# Patient Record
Sex: MALE | Race: WHITE | Employment: FULL TIME | URBAN - METROPOLITAN AREA
[De-identification: names, ages, dates, MRNs, and addresses within clinical notes are randomized per-mention and may not be internally consistent; named-entity substitution may affect disease eponyms.]

---

## 2017-05-23 ENCOUNTER — TELEPHONE (OUTPATIENT)
Dept: INTERNAL MEDICINE CLINIC | Facility: CLINIC | Age: 42
End: 2017-05-23

## 2017-05-23 DIAGNOSIS — I10 ESSENTIAL HYPERTENSION: Primary | ICD-10-CM

## 2017-05-23 RX ORDER — AMLODIPINE BESYLATE 5 MG/1
5 TABLET ORAL
Qty: 30 TABLET | Refills: 0 | Status: SHIPPED | OUTPATIENT
Start: 2017-05-23 | End: 2017-06-08

## 2017-05-23 RX ORDER — AMLODIPINE BESYLATE 5 MG/1
5 TABLET ORAL
Qty: 90 TABLET | Refills: 2 | Status: CANCELLED | OUTPATIENT
Start: 2017-05-23

## 2017-05-31 ENCOUNTER — LAB ENCOUNTER (OUTPATIENT)
Dept: LAB | Age: 42
End: 2017-05-31
Attending: NURSE PRACTITIONER
Payer: COMMERCIAL

## 2017-05-31 DIAGNOSIS — Z00.00 ROUTINE GENERAL MEDICAL EXAMINATION AT A HEALTH CARE FACILITY: Primary | ICD-10-CM

## 2017-05-31 DIAGNOSIS — Z13.29 SCREENING FOR ENDOCRINE, METABOLIC AND IMMUNITY DISORDER: ICD-10-CM

## 2017-05-31 DIAGNOSIS — Z13.228 SCREENING FOR ENDOCRINE, METABOLIC AND IMMUNITY DISORDER: ICD-10-CM

## 2017-05-31 DIAGNOSIS — Z13.0 SCREENING FOR ENDOCRINE, METABOLIC AND IMMUNITY DISORDER: ICD-10-CM

## 2017-05-31 DIAGNOSIS — Z00.00 ROUTINE GENERAL MEDICAL EXAMINATION AT A HEALTH CARE FACILITY: ICD-10-CM

## 2017-05-31 DIAGNOSIS — Z13.0 SCREENING FOR DISORDER OF BLOOD AND BLOOD-FORMING ORGANS: ICD-10-CM

## 2017-05-31 DIAGNOSIS — Z13.220 SCREENING FOR LIPID DISORDERS: ICD-10-CM

## 2017-05-31 DIAGNOSIS — Z13.29 SCREENING FOR THYROID DISORDER: ICD-10-CM

## 2017-05-31 PROCEDURE — 80061 LIPID PANEL: CPT | Performed by: NURSE PRACTITIONER

## 2017-05-31 PROCEDURE — 80050 GENERAL HEALTH PANEL: CPT | Performed by: NURSE PRACTITIONER

## 2017-06-01 NOTE — TELEPHONE ENCOUNTER
Labs were already completed 05/31/17. Results are in and pt has upcoming appt to discuss them  Future Appointments  Date Time Provider Mikala Parker   6/8/2017 8:45 AM NIELS Stafford [facility]     Encounter closing.

## 2017-06-08 ENCOUNTER — OFFICE VISIT (OUTPATIENT)
Dept: INTERNAL MEDICINE CLINIC | Facility: CLINIC | Age: 42
End: 2017-06-08

## 2017-06-08 VITALS
SYSTOLIC BLOOD PRESSURE: 128 MMHG | HEART RATE: 92 BPM | WEIGHT: 173 LBS | HEIGHT: 67 IN | DIASTOLIC BLOOD PRESSURE: 80 MMHG | BODY MASS INDEX: 27.15 KG/M2 | TEMPERATURE: 99 F

## 2017-06-08 DIAGNOSIS — M25.512 ACUTE PAIN OF LEFT SHOULDER: ICD-10-CM

## 2017-06-08 DIAGNOSIS — E78.5 DYSLIPIDEMIA: ICD-10-CM

## 2017-06-08 DIAGNOSIS — I10 ESSENTIAL HYPERTENSION: ICD-10-CM

## 2017-06-08 DIAGNOSIS — J45.20 MILD INTERMITTENT ASTHMA WITHOUT COMPLICATION: ICD-10-CM

## 2017-06-08 DIAGNOSIS — Z00.00 ROUTINE GENERAL MEDICAL EXAMINATION AT A HEALTH CARE FACILITY: Primary | ICD-10-CM

## 2017-06-08 PROCEDURE — 99396 PREV VISIT EST AGE 40-64: CPT | Performed by: NURSE PRACTITIONER

## 2017-06-08 RX ORDER — AMLODIPINE BESYLATE 5 MG/1
5 TABLET ORAL
Qty: 90 TABLET | Refills: 3 | Status: SHIPPED | OUTPATIENT
Start: 2017-06-08 | End: 2018-09-07

## 2017-06-08 NOTE — PROGRESS NOTES
Carlos Palomares is a 35year old male who presents for a complete physical exam.     HPI:   Pt complains of   HTN:   Amlodipine 5mg.   He does not check bp at home   Discussed monitoring at home and he is very interested  Repeat bp 128/80  Discussed goals Disp: 1 Inhaler Rfl: 1      Past Medical History   Diagnosis Date   • Cervicalgia           Past Surgical History    VASECTOMY  2010    OTHER SURGICAL HISTORY  2004    Comment spermatic cord excision of varicocele      Family History   Problem Relation Age tenderness  BREAST: no dominant or suspicious mass  LUNGS: clear to auscultation  CARDIO: RRR without murmur  GI: normal BS, soft, no masses, HSM or tenderness  : two descended testes,no masses,no hernia,no penile lesions  MUSCULOSKELETAL: back is not te

## 2018-02-05 ENCOUNTER — OFFICE VISIT (OUTPATIENT)
Dept: INTERNAL MEDICINE CLINIC | Facility: CLINIC | Age: 43
End: 2018-02-05

## 2018-02-05 VITALS
HEART RATE: 82 BPM | DIASTOLIC BLOOD PRESSURE: 82 MMHG | TEMPERATURE: 98 F | WEIGHT: 169 LBS | HEIGHT: 67.5 IN | RESPIRATION RATE: 16 BRPM | BODY MASS INDEX: 26.22 KG/M2 | SYSTOLIC BLOOD PRESSURE: 136 MMHG

## 2018-02-05 DIAGNOSIS — Z00.00 LABORATORY EXAMINATION ORDERED AS PART OF A COMPLETE PHYSICAL EXAMINATION: ICD-10-CM

## 2018-02-05 DIAGNOSIS — H92.01 RIGHT EAR PAIN: Primary | ICD-10-CM

## 2018-02-05 DIAGNOSIS — J45.20 MILD INTERMITTENT ASTHMA WITHOUT COMPLICATION: ICD-10-CM

## 2018-02-05 DIAGNOSIS — H65.03 BILATERAL ACUTE SEROUS OTITIS MEDIA, RECURRENCE NOT SPECIFIED: ICD-10-CM

## 2018-02-05 PROCEDURE — 99213 OFFICE O/P EST LOW 20 MIN: CPT | Performed by: NURSE PRACTITIONER

## 2018-02-05 RX ORDER — AZITHROMYCIN 250 MG/1
TABLET, FILM COATED ORAL
Qty: 6 TABLET | Refills: 0 | Status: SHIPPED | OUTPATIENT
Start: 2018-02-05 | End: 2019-03-15

## 2018-02-05 RX ORDER — FLUTICASONE PROPIONATE 50 MCG
1 SPRAY, SUSPENSION (ML) NASAL 2 TIMES DAILY
Qty: 1 BOTTLE | Refills: 3 | Status: SHIPPED | OUTPATIENT
Start: 2018-02-05 | End: 2019-03-15

## 2018-02-05 NOTE — PROGRESS NOTES
Faraz Orozco is a 38year old male. Patient presents with:  Sore Throat: pain in the right ear, mild pain in the left since wednesday      HPI:   Here for eval right ear pain and sore throat. Muffled sounds bilaterally. No fever or chills.   Using v constipation  MUSCULOSKELETAL:  No arthralgias or myalgias  NEURO: denies headaches,     EXAM:   /82 (BP Location: Right arm, Patient Position: Sitting, Cuff Size: large)   Pulse 82   Temp 98.1 °F (36.7 °C)   Resp 16   Ht 67.5\"   Wt 169 lb   BMI 26.

## 2018-02-13 ENCOUNTER — HOSPITAL ENCOUNTER (OUTPATIENT)
Age: 43
Discharge: HOME | End: 2018-02-13
Attending: FAMILY MEDICINE
Payer: COMMERCIAL

## 2018-02-13 ENCOUNTER — APPOINTMENT (OUTPATIENT)
Dept: GENERAL RADIOLOGY | Age: 43
End: 2018-02-13
Attending: FAMILY MEDICINE
Payer: COMMERCIAL

## 2018-02-13 VITALS
BODY MASS INDEX: 25.9 KG/M2 | HEART RATE: 70 BPM | DIASTOLIC BLOOD PRESSURE: 92 MMHG | HEIGHT: 67 IN | RESPIRATION RATE: 16 BRPM | OXYGEN SATURATION: 100 % | TEMPERATURE: 97 F | SYSTOLIC BLOOD PRESSURE: 147 MMHG | WEIGHT: 165 LBS

## 2018-02-13 DIAGNOSIS — R42 VERTIGO: Primary | ICD-10-CM

## 2018-02-13 DIAGNOSIS — H65.93 MIDDLE EAR EFFUSION, BILATERAL: ICD-10-CM

## 2018-02-13 DIAGNOSIS — G93.3 POST VIRAL SYNDROME: ICD-10-CM

## 2018-02-13 DIAGNOSIS — J45.909 MILD REACTIVE AIRWAYS DISEASE, UNSPECIFIED WHETHER PERSISTENT: ICD-10-CM

## 2018-02-13 DIAGNOSIS — H83.03 LABYRINTHITIS OF BOTH EARS: ICD-10-CM

## 2018-02-13 LAB
#LYMPHOCYTE IC: 1.8 X10ˆ3/UL (ref 0.9–3.2)
#MXD IC: 0.7 X10ˆ3/UL (ref 0.1–1)
#NEUTROPHIL IC: 6.2 X10ˆ3/UL (ref 1.3–6.7)
CREAT SERPL-MCNC: 1.1 MG/DL (ref 0.7–1.3)
GLUCOSE BLD-MCNC: 99 MG/DL (ref 70–99)
HCT IC: 43.9 % (ref 37–54)
HGB IC: 15 G/DL (ref 13–17)
ISTAT BLOOD GAS TCO2: 29 MMOL/L (ref 22–32)
ISTAT BUN: 24 MG/DL (ref 8–20)
ISTAT CHLORIDE: 102 MMOL/L (ref 101–111)
ISTAT HEMATOCRIT: 43 % (ref 37–53)
ISTAT IONIZED CALCIUM: 1.22 MMOL/L (ref 1.12–1.32)
ISTAT POTASSIUM: 4 MMOL/L (ref 3.6–5.1)
ISTAT SODIUM: 140 MMOL/L (ref 136–144)
ISTAT TROPONIN: <0.1 NG/ML (ref ?–0.1)
LYMPHOCYTES NFR BLD AUTO: 21 %
MCH IC: 29.6 PG (ref 27–33.2)
MCHC IC: 34.2 G/DL (ref 31–37)
MCV IC: 86.8 FL (ref 80–99)
MIXED CELL %: 7.7 %
NEUTROPHILS NFR BLD AUTO: 71.3 %
PLT IC: 254 X10ˆ3/UL (ref 150–450)
POCT RAPID STREP: NEGATIVE
RBC IC: 5.06 X10ˆ6/UL (ref 4.3–5.7)
WBC IC: 8.7 X10ˆ3/UL (ref 4–13)

## 2018-02-13 PROCEDURE — 87081 CULTURE SCREEN ONLY: CPT | Performed by: FAMILY MEDICINE

## 2018-02-13 PROCEDURE — 71046 X-RAY EXAM CHEST 2 VIEWS: CPT | Performed by: FAMILY MEDICINE

## 2018-02-13 PROCEDURE — 93005 ELECTROCARDIOGRAM TRACING: CPT

## 2018-02-13 PROCEDURE — 80047 BASIC METABLC PNL IONIZED CA: CPT

## 2018-02-13 PROCEDURE — 96360 HYDRATION IV INFUSION INIT: CPT

## 2018-02-13 PROCEDURE — 87430 STREP A AG IA: CPT | Performed by: FAMILY MEDICINE

## 2018-02-13 PROCEDURE — 84484 ASSAY OF TROPONIN QUANT: CPT

## 2018-02-13 PROCEDURE — 99215 OFFICE O/P EST HI 40 MIN: CPT

## 2018-02-13 PROCEDURE — 85025 COMPLETE CBC W/AUTO DIFF WBC: CPT | Performed by: FAMILY MEDICINE

## 2018-02-13 PROCEDURE — 94640 AIRWAY INHALATION TREATMENT: CPT

## 2018-02-13 PROCEDURE — 99205 OFFICE O/P NEW HI 60 MIN: CPT

## 2018-02-13 PROCEDURE — 93010 ELECTROCARDIOGRAM REPORT: CPT

## 2018-02-13 RX ORDER — SODIUM CHLORIDE 9 MG/ML
1000 INJECTION, SOLUTION INTRAVENOUS ONCE
Status: COMPLETED | OUTPATIENT
Start: 2018-02-13 | End: 2018-02-13

## 2018-02-13 RX ORDER — MECLIZINE HYDROCHLORIDE 25 MG/1
25 TABLET ORAL 3 TIMES DAILY PRN
Qty: 15 TABLET | Refills: 0 | Status: SHIPPED | OUTPATIENT
Start: 2018-02-13 | End: 2018-02-18

## 2018-02-13 RX ORDER — PREDNISONE 10 MG/1
TABLET ORAL
Qty: 30 TABLET | Refills: 0 | Status: SHIPPED | OUTPATIENT
Start: 2018-02-13 | End: 2018-02-22

## 2018-02-13 RX ORDER — IPRATROPIUM BROMIDE AND ALBUTEROL SULFATE 2.5; .5 MG/3ML; MG/3ML
3 SOLUTION RESPIRATORY (INHALATION) ONCE
Status: COMPLETED | OUTPATIENT
Start: 2018-02-13 | End: 2018-02-13

## 2018-02-13 NOTE — ED PROVIDER NOTES
Patient Seen in: [id number] [facility]    History   Patient presents with:  Headache (neurologic)  Dizziness (neurologic)    Stated Complaint: DIZZINESS/HEAD ACHE/LEGS NUMBNESS    HPI  39 yo M here with complaints of dizziness (feeling like he 16   Ht 170.2 cm (5' 7\")   Wt 74.8 kg   SpO2 100%   BMI 25.84 kg/m²         Physical Exam  GEN: Not in any acute distress, making good conversation, answering appropriately   SKIN: No pallor, no erythema, no cyanosis, warm and dry  Eyes: wnl, normal conju cartridge Once      ISTAT Troponin Once      Grp A Strep Cult, Throat Once      Orthostatic VS          Order Comments:              Orthostatic VS      iStat (Chem 8)      Place PIV Once          Order Comments:              Saline lock      Nursing commu 2123  ------------------------------------------------------------       [facility]       Labs are all normal except for mild dehydration noted   EKG is normal and normal cardiac lab test   Avoid exercise or any strenuous activity for the next 1-2 weeks   Rx Carlosl

## 2018-02-13 NOTE — ED INITIAL ASSESSMENT (HPI)
Pt sts 2 weeks ago began with joan ears feeling plugged, and like he was \"coming down with something\". Has been flying frequently recently. Saw PMD 10 days ago- prescribed z juliana. . Ear issue resolved by Friday. On Sunday began feeling dizzy.  HA began tod

## 2018-02-15 LAB
ATRIAL RATE: 68 BPM
P AXIS: -2 DEGREES
P-R INTERVAL: 156 MS
Q-T INTERVAL: 394 MS
QRS DURATION: 80 MS
QTC CALCULATION (BEZET): 418 MS
R AXIS: 13 DEGREES
T AXIS: 9 DEGREES
VENTRICULAR RATE: 68 BPM

## 2018-09-07 DIAGNOSIS — I10 ESSENTIAL HYPERTENSION: ICD-10-CM

## 2018-09-07 RX ORDER — AMLODIPINE BESYLATE 5 MG/1
TABLET ORAL
Qty: 90 TABLET | Refills: 0 | Status: SHIPPED | OUTPATIENT
Start: 2018-09-07 | End: 2019-03-15

## 2018-09-07 NOTE — TELEPHONE ENCOUNTER
Last Office Visit: 2-5-18 with SD for sore throat  Last Rx Filled: 6-8-17 90 tabs with 3 refills  Last Labs: 2-13-18 troponin/cmp/cbc/rapid strep/strep culture  Future Appointment: none    Per protocol to provider

## 2019-03-11 ENCOUNTER — LAB ENCOUNTER (OUTPATIENT)
Dept: LAB | Facility: HOSPITAL | Age: 44
End: 2019-03-11
Attending: INTERNAL MEDICINE
Payer: COMMERCIAL

## 2019-03-11 ENCOUNTER — TELEPHONE (OUTPATIENT)
Dept: INTERNAL MEDICINE CLINIC | Facility: CLINIC | Age: 44
End: 2019-03-11

## 2019-03-11 DIAGNOSIS — Z13.228 SCREENING FOR METABOLIC DISORDER: ICD-10-CM

## 2019-03-11 DIAGNOSIS — Z00.00 ROUTINE GENERAL MEDICAL EXAMINATION AT A HEALTH CARE FACILITY: ICD-10-CM

## 2019-03-11 DIAGNOSIS — Z13.29 SCREENING FOR THYROID DISORDER: ICD-10-CM

## 2019-03-11 DIAGNOSIS — Z13.220 SCREENING FOR LIPID DISORDERS: ICD-10-CM

## 2019-03-11 DIAGNOSIS — Z13.0 SCREENING FOR DISORDER OF BLOOD AND BLOOD-FORMING ORGANS: ICD-10-CM

## 2019-03-11 DIAGNOSIS — Z00.00 ROUTINE GENERAL MEDICAL EXAMINATION AT A HEALTH CARE FACILITY: Primary | ICD-10-CM

## 2019-03-11 LAB
ALBUMIN SERPL-MCNC: 3.9 G/DL (ref 3.4–5)
ALBUMIN/GLOB SERPL: 1.1 {RATIO} (ref 1–2)
ALP LIVER SERPL-CCNC: 102 U/L (ref 45–117)
ALT SERPL-CCNC: 34 U/L (ref 16–61)
ANION GAP SERPL CALC-SCNC: 6 MMOL/L (ref 0–18)
AST SERPL-CCNC: 31 U/L (ref 15–37)
BASOPHILS # BLD AUTO: 0.02 X10(3) UL (ref 0–0.2)
BASOPHILS NFR BLD AUTO: 0.3 %
BILIRUB SERPL-MCNC: 0.7 MG/DL (ref 0.1–2)
BUN BLD-MCNC: 18 MG/DL (ref 7–18)
BUN/CREAT SERPL: 15.1 (ref 10–20)
CALCIUM BLD-MCNC: 9 MG/DL (ref 8.5–10.1)
CHLORIDE SERPL-SCNC: 108 MMOL/L (ref 98–107)
CHOLEST SMN-MCNC: 195 MG/DL (ref ?–200)
CO2 SERPL-SCNC: 27 MMOL/L (ref 21–32)
CREAT BLD-MCNC: 1.19 MG/DL (ref 0.7–1.3)
DEPRECATED RDW RBC AUTO: 40 FL (ref 35.1–46.3)
EOSINOPHIL # BLD AUTO: 0.08 X10(3) UL (ref 0–0.7)
EOSINOPHIL NFR BLD AUTO: 1.2 %
ERYTHROCYTE [DISTWIDTH] IN BLOOD BY AUTOMATED COUNT: 12.6 % (ref 11–15)
GLOBULIN PLAS-MCNC: 3.7 G/DL (ref 2.8–4.4)
GLUCOSE BLD-MCNC: 87 MG/DL (ref 70–99)
HCT VFR BLD AUTO: 44 % (ref 39–53)
HDLC SERPL-MCNC: 48 MG/DL (ref 40–59)
HGB BLD-MCNC: 14.7 G/DL (ref 13–17.5)
IMM GRANULOCYTES # BLD AUTO: 0.03 X10(3) UL (ref 0–1)
IMM GRANULOCYTES NFR BLD: 0.5 %
LDLC SERPL CALC-MCNC: 121 MG/DL (ref ?–100)
LYMPHOCYTES # BLD AUTO: 1.76 X10(3) UL (ref 1–4)
LYMPHOCYTES NFR BLD AUTO: 26.8 %
M PROTEIN MFR SERPL ELPH: 7.6 G/DL (ref 6.4–8.2)
MCH RBC QN AUTO: 29.1 PG (ref 26–34)
MCHC RBC AUTO-ENTMCNC: 33.4 G/DL (ref 31–37)
MCV RBC AUTO: 87.1 FL (ref 80–100)
MONOCYTES # BLD AUTO: 0.58 X10(3) UL (ref 0.1–1)
MONOCYTES NFR BLD AUTO: 8.8 %
NEUTROPHILS # BLD AUTO: 4.1 X10 (3) UL (ref 1.5–7.7)
NEUTROPHILS # BLD AUTO: 4.1 X10(3) UL (ref 1.5–7.7)
NEUTROPHILS NFR BLD AUTO: 62.4 %
NONHDLC SERPL-MCNC: 147 MG/DL (ref ?–130)
OSMOLALITY SERPL CALC.SUM OF ELEC: 293 MOSM/KG (ref 275–295)
PLATELET # BLD AUTO: 220 10(3)UL (ref 150–450)
POTASSIUM SERPL-SCNC: 3.8 MMOL/L (ref 3.5–5.1)
RBC # BLD AUTO: 5.05 X10(6)UL (ref 4.3–5.7)
SODIUM SERPL-SCNC: 141 MMOL/L (ref 136–145)
TRIGL SERPL-MCNC: 129 MG/DL (ref 30–149)
TSI SER-ACNC: 0.47 MIU/ML (ref 0.36–3.74)
VLDLC SERPL CALC-MCNC: 26 MG/DL (ref 0–30)
WBC # BLD AUTO: 6.6 X10(3) UL (ref 4–11)

## 2019-03-11 PROCEDURE — 36415 COLL VENOUS BLD VENIPUNCTURE: CPT

## 2019-03-11 PROCEDURE — 84443 ASSAY THYROID STIM HORMONE: CPT

## 2019-03-11 PROCEDURE — 80061 LIPID PANEL: CPT

## 2019-03-11 PROCEDURE — 85025 COMPLETE CBC W/AUTO DIFF WBC: CPT

## 2019-03-11 PROCEDURE — 80053 COMPREHEN METABOLIC PANEL: CPT

## 2019-03-11 NOTE — TELEPHONE ENCOUNTER
pt jeffrey fritsch [birth year] is sched for cpe this friday and no orders put in yet-he was to see SD but she is not here so I moved him to AD-please add cpe labs as he wants to come here this AM since he is fasting-please let me know when done so i can call

## 2019-03-15 ENCOUNTER — OFFICE VISIT (OUTPATIENT)
Dept: INTERNAL MEDICINE CLINIC | Facility: CLINIC | Age: 44
End: 2019-03-15
Payer: COMMERCIAL

## 2019-03-15 VITALS
RESPIRATION RATE: 16 BRPM | BODY MASS INDEX: 26.49 KG/M2 | WEIGHT: 168.81 LBS | SYSTOLIC BLOOD PRESSURE: 132 MMHG | HEIGHT: 67 IN | HEART RATE: 80 BPM | OXYGEN SATURATION: 98 % | DIASTOLIC BLOOD PRESSURE: 80 MMHG

## 2019-03-15 DIAGNOSIS — E78.5 DYSLIPIDEMIA: ICD-10-CM

## 2019-03-15 DIAGNOSIS — I10 ESSENTIAL HYPERTENSION: ICD-10-CM

## 2019-03-15 DIAGNOSIS — N52.1 ERECTILE DYSFUNCTION DUE TO DISEASES CLASSIFIED ELSEWHERE: ICD-10-CM

## 2019-03-15 DIAGNOSIS — Z00.00 ANNUAL PHYSICAL EXAM: Primary | ICD-10-CM

## 2019-03-15 DIAGNOSIS — J45.20 MILD INTERMITTENT ASTHMA WITHOUT COMPLICATION: ICD-10-CM

## 2019-03-15 DIAGNOSIS — L98.9 FACIAL SKIN LESION: ICD-10-CM

## 2019-03-15 PROCEDURE — 99396 PREV VISIT EST AGE 40-64: CPT | Performed by: INTERNAL MEDICINE

## 2019-03-15 RX ORDER — SILDENAFIL 50 MG/1
50 TABLET, FILM COATED ORAL
Qty: 7 TABLET | Refills: 1 | Status: SHIPPED | OUTPATIENT
Start: 2019-03-15 | End: 2020-06-04

## 2019-03-15 NOTE — PROGRESS NOTES
Madan Urrutia  10/12/1975    Patient presents with:  Physical: cn room 6: patient is here for a physical       HPI:   Madan Urrutia is a 37year old male who presents for an annual physical examination.     The patient has been in his usual state of Hypertension Mother       Social History    Tobacco Use      Smoking status: Never Smoker      Smokeless tobacco: Never Used    Alcohol use:  Yes      Alcohol/week: 0.6 oz      Types: 1 Standard drinks or equivalent per week      Frequency: 2-4 times a ayaka management    Psychogenic erectile dysfunction:  I have offered the patient a short course of medical therapy    Benign essential hypertension:  Resolved with lifestyle management only  I have discontinued Norvasc from the patient's medical regimen at this

## 2019-05-16 ENCOUNTER — OFFICE VISIT (OUTPATIENT)
Dept: INTERNAL MEDICINE CLINIC | Facility: CLINIC | Age: 44
End: 2019-05-16

## 2019-05-16 VITALS
HEART RATE: 80 BPM | WEIGHT: 179 LBS | HEIGHT: 67 IN | BODY MASS INDEX: 28.09 KG/M2 | SYSTOLIC BLOOD PRESSURE: 154 MMHG | RESPIRATION RATE: 16 BRPM | DIASTOLIC BLOOD PRESSURE: 108 MMHG

## 2019-05-16 DIAGNOSIS — I10 ESSENTIAL HYPERTENSION: Primary | ICD-10-CM

## 2019-05-16 DIAGNOSIS — E78.5 DYSLIPIDEMIA: ICD-10-CM

## 2019-05-16 DIAGNOSIS — L98.9 FACIAL SKIN LESION: ICD-10-CM

## 2019-05-16 PROCEDURE — 99214 OFFICE O/P EST MOD 30 MIN: CPT | Performed by: INTERNAL MEDICINE

## 2019-05-16 RX ORDER — AMLODIPINE BESYLATE 5 MG/1
5 TABLET ORAL DAILY
Qty: 90 TABLET | Refills: 3 | Status: SHIPPED | OUTPATIENT
Start: 2019-05-16 | End: 2020-03-20

## 2019-05-16 NOTE — PROGRESS NOTES
Katherine Morales  10/12/1975    Patient presents with: Follow - Up: on blood pressure medication. Pt states he is having headaches lately and he went to optometrist office BP was elevated.       Imanjosie Wendy is a 37year old male who pre for Wheezing.  Disp: 1 Inhaler Rfl: 1      No Known Allergies   Past Medical History:   Diagnosis Date   • Cervicalgia    • Essential hypertension       Patient Active Problem List:     Asthma     Unspecified essential hypertension     Dyslipidemia     Past office. Skin lesion on the right face:  Stable  Re-referred to dermatology service    Dyslipidemia:  Lifestyle management advised    The patient indicates understanding of these issues and agrees to the plan.     TODAY'S ORDERS     No orders of the defin

## 2020-03-20 RX ORDER — AMLODIPINE BESYLATE 5 MG/1
TABLET ORAL
Qty: 30 TABLET | Refills: 0 | Status: SHIPPED | OUTPATIENT
Start: 2020-03-20 | End: 2020-05-20

## 2020-03-20 NOTE — TELEPHONE ENCOUNTER
Last VISIT 05/16/19    Last REFILL 05/16/19 qty 90 w/3 refills    Last LABS 03/11/19 CBC, CMP, Lipid, TSH    No future appointments. Per PROTOCOL? Failed    Please Approve or Deny.

## 2020-03-20 NOTE — TELEPHONE ENCOUNTER
Overdue for office visit. Needs laboratory and physical evaluation. 30-day supply given. Please schedule for 30 days from now at this time.

## 2020-05-20 RX ORDER — AMLODIPINE BESYLATE 5 MG/1
TABLET ORAL
Qty: 30 TABLET | Refills: 0 | Status: SHIPPED | OUTPATIENT
Start: 2020-05-20 | End: 2020-06-04

## 2020-05-20 NOTE — TELEPHONE ENCOUNTER
Last Ov:5/16/19  Upcoming appt:none  Last labs:3/11/19 cbc, cmp, lipid, TSH  Last Rx: 3/20/20 Amlodipine Besylate 5mg    Per Protocol sent for review    Due for physical

## 2020-05-28 ENCOUNTER — TELEPHONE (OUTPATIENT)
Dept: INTERNAL MEDICINE CLINIC | Facility: CLINIC | Age: 45
End: 2020-05-28

## 2020-05-28 DIAGNOSIS — Z13.0 SCREENING FOR DISORDER OF BLOOD AND BLOOD-FORMING ORGANS: ICD-10-CM

## 2020-05-28 DIAGNOSIS — Z13.220 SCREENING FOR LIPID DISORDERS: ICD-10-CM

## 2020-05-28 DIAGNOSIS — I10 ESSENTIAL HYPERTENSION: ICD-10-CM

## 2020-05-28 DIAGNOSIS — Z13.228 SCREENING FOR METABOLIC DISORDER: ICD-10-CM

## 2020-05-28 DIAGNOSIS — Z13.29 SCREENING FOR THYROID DISORDER: ICD-10-CM

## 2020-05-28 DIAGNOSIS — Z00.00 ROUTINE GENERAL MEDICAL EXAMINATION AT A HEALTH CARE FACILITY: ICD-10-CM

## 2020-05-28 DIAGNOSIS — E78.5 DYSLIPIDEMIA: ICD-10-CM

## 2020-05-28 DIAGNOSIS — J45.20 MILD INTERMITTENT ASTHMA WITHOUT COMPLICATION: ICD-10-CM

## 2020-05-28 DIAGNOSIS — Z00.00 ANNUAL PHYSICAL EXAM: Primary | ICD-10-CM

## 2020-05-28 DIAGNOSIS — Z12.5 ENCOUNTER FOR SCREENING FOR MALIGNANT NEOPLASM OF PROSTATE: ICD-10-CM

## 2020-05-28 NOTE — TELEPHONE ENCOUNTER
Future Appointments   Date Time Provider Mikala Parker   6/4/2020  3:00 PM Tim Paul MD [facility]     For CPE,  APPT IS NEXT WEEK       [facility] Pt aware to complete 5-7 days ahead.

## 2020-05-28 NOTE — TELEPHONE ENCOUNTER
Future Appointments   Date Time Provider Mikala Parker   6/4/2020  3:00 PM Mitra Vela MD [facility]     For CPE

## 2020-06-02 ENCOUNTER — LAB ENCOUNTER (OUTPATIENT)
Dept: LAB | Facility: HOSPITAL | Age: 45
End: 2020-06-02
Attending: INTERNAL MEDICINE
Payer: COMMERCIAL

## 2020-06-02 DIAGNOSIS — J45.20 MILD INTERMITTENT ASTHMA WITHOUT COMPLICATION: ICD-10-CM

## 2020-06-02 DIAGNOSIS — Z13.29 SCREENING FOR THYROID DISORDER: ICD-10-CM

## 2020-06-02 DIAGNOSIS — Z13.220 SCREENING FOR LIPID DISORDERS: ICD-10-CM

## 2020-06-02 DIAGNOSIS — Z12.5 ENCOUNTER FOR SCREENING FOR MALIGNANT NEOPLASM OF PROSTATE: ICD-10-CM

## 2020-06-02 DIAGNOSIS — E78.5 DYSLIPIDEMIA: ICD-10-CM

## 2020-06-02 DIAGNOSIS — Z00.00 ROUTINE GENERAL MEDICAL EXAMINATION AT A HEALTH CARE FACILITY: ICD-10-CM

## 2020-06-02 DIAGNOSIS — I10 ESSENTIAL HYPERTENSION: ICD-10-CM

## 2020-06-02 DIAGNOSIS — Z13.0 SCREENING FOR DISORDER OF BLOOD AND BLOOD-FORMING ORGANS: ICD-10-CM

## 2020-06-02 DIAGNOSIS — Z13.228 SCREENING FOR METABOLIC DISORDER: ICD-10-CM

## 2020-06-02 PROCEDURE — 85025 COMPLETE CBC W/AUTO DIFF WBC: CPT

## 2020-06-02 PROCEDURE — 80053 COMPREHEN METABOLIC PANEL: CPT

## 2020-06-02 PROCEDURE — 84443 ASSAY THYROID STIM HORMONE: CPT

## 2020-06-02 PROCEDURE — 80061 LIPID PANEL: CPT

## 2020-06-02 PROCEDURE — 36415 COLL VENOUS BLD VENIPUNCTURE: CPT

## 2020-06-04 ENCOUNTER — OFFICE VISIT (OUTPATIENT)
Dept: INTERNAL MEDICINE CLINIC | Facility: CLINIC | Age: 45
End: 2020-06-04
Payer: COMMERCIAL

## 2020-06-04 VITALS
RESPIRATION RATE: 16 BRPM | WEIGHT: 169 LBS | SYSTOLIC BLOOD PRESSURE: 128 MMHG | TEMPERATURE: 99 F | HEART RATE: 88 BPM | BODY MASS INDEX: 26.84 KG/M2 | DIASTOLIC BLOOD PRESSURE: 86 MMHG | HEIGHT: 66.73 IN

## 2020-06-04 DIAGNOSIS — N52.1 ERECTILE DYSFUNCTION DUE TO DISEASES CLASSIFIED ELSEWHERE: ICD-10-CM

## 2020-06-04 DIAGNOSIS — J45.20 MILD INTERMITTENT ASTHMA WITHOUT COMPLICATION: ICD-10-CM

## 2020-06-04 DIAGNOSIS — I10 ESSENTIAL HYPERTENSION: ICD-10-CM

## 2020-06-04 DIAGNOSIS — Z00.00 ANNUAL PHYSICAL EXAM: Primary | ICD-10-CM

## 2020-06-04 PROCEDURE — 99396 PREV VISIT EST AGE 40-64: CPT | Performed by: INTERNAL MEDICINE

## 2020-06-04 RX ORDER — AMLODIPINE BESYLATE 5 MG/1
5 TABLET ORAL DAILY
Qty: 90 TABLET | Refills: 3 | Status: SHIPPED | OUTPATIENT
Start: 2020-06-04

## 2020-06-04 RX ORDER — AMLODIPINE BESYLATE 5 MG/1
5 TABLET ORAL DAILY
Qty: 90 TABLET | Refills: 3 | Status: SHIPPED | OUTPATIENT
Start: 2020-06-04 | End: 2020-06-04

## 2020-06-04 RX ORDER — SILDENAFIL 100 MG/1
100 TABLET, FILM COATED ORAL
Qty: 10 TABLET | Refills: 1 | Status: SHIPPED | OUTPATIENT
Start: 2020-06-04

## 2020-06-04 RX ORDER — SILDENAFIL 100 MG/1
100 TABLET, FILM COATED ORAL
Qty: 10 TABLET | Refills: 1 | Status: SHIPPED | OUTPATIENT
Start: 2020-06-04 | End: 2020-06-04

## 2020-06-04 NOTE — PROGRESS NOTES
Tiny Morillo  10/12/1975    Patient presents with:  Wellness Visit      HPI:   Tiny Morillo is a 53year old male who presents for an annual physical examination.     The patient has been in his usual state of health and denies any acute complaint drinks      Types: 1 Standard drinks or equivalent per week      Frequency: 2-4 times a month      Drinks per session: 3 or 4      Binge frequency: Less than monthly      Comment: CAGe 6/4/20    Drug use: No        REVIEW OF SYSTEMS:   GENERAL: feels well placed in this encounter. Meds & Refills:  Requested Prescriptions     Signed Prescriptions Disp Refills   • amLODIPine Besylate 5 MG Oral Tab 90 tablet 3     Sig: Take 1 tablet (5 mg total) by mouth daily.    • Sildenafil Citrate (VIAGRA) 100 MG Oral

## 2021-04-08 ENCOUNTER — TELEPHONE (OUTPATIENT)
Dept: INTERNAL MEDICINE CLINIC | Facility: CLINIC | Age: 46
End: 2021-04-08

## 2021-04-08 NOTE — TELEPHONE ENCOUNTER
Patient states one month ago a blood vessel broke in his right eye, saw his EYE MD who reported no damage to his eye but talked to him about elevated BP, lifting weights.   Pt started taking BP again after that at home and noticed the last 2-3 days his BP h

## 2021-04-08 NOTE — TELEPHONE ENCOUNTER
Okay to increase to amlodipine 5 mg 2 tabs once daily (10 mg every morning). Will see patient and manually evaluate BP on 4/13 during scheduled ov.

## 2021-04-08 NOTE — TELEPHONE ENCOUNTER
Future Appointments   Date Time Provider Mikala Parker   4/13/2021  1:20 PM Kelly Cornejo MD [facility]     Patient has an appointment on Tuesday and is on Amlodipine 5 mg for BP. Patient noticed the last 2 days that his BP is high.   Patient

## 2021-04-08 NOTE — TELEPHONE ENCOUNTER
Patient notified ok to increase Amlodipine 5mg daily to 10mg daily- take 2- 5mg tablets in the am, monitor and log BP and bring in log and bp cuff with him to appt scheduled for 4/13/21. Pt verbalizes understanding.

## 2021-04-13 ENCOUNTER — OFFICE VISIT (OUTPATIENT)
Dept: INTERNAL MEDICINE CLINIC | Facility: CLINIC | Age: 46
End: 2021-04-13
Payer: COMMERCIAL

## 2021-04-13 VITALS
SYSTOLIC BLOOD PRESSURE: 134 MMHG | BODY MASS INDEX: 27 KG/M2 | WEIGHT: 170 LBS | TEMPERATURE: 98 F | HEART RATE: 83 BPM | HEIGHT: 66.73 IN | DIASTOLIC BLOOD PRESSURE: 74 MMHG

## 2021-04-13 DIAGNOSIS — I10 ESSENTIAL HYPERTENSION: ICD-10-CM

## 2021-04-13 DIAGNOSIS — G47.33 MODERATE OBSTRUCTIVE SLEEP APNEA: Primary | ICD-10-CM

## 2021-04-13 DIAGNOSIS — N52.9 ERECTILE DYSFUNCTION, UNSPECIFIED ERECTILE DYSFUNCTION TYPE: ICD-10-CM

## 2021-04-13 PROCEDURE — 99214 OFFICE O/P EST MOD 30 MIN: CPT | Performed by: INTERNAL MEDICINE

## 2021-04-13 PROCEDURE — 3008F BODY MASS INDEX DOCD: CPT | Performed by: INTERNAL MEDICINE

## 2021-04-13 PROCEDURE — 3075F SYST BP GE 130 - 139MM HG: CPT | Performed by: INTERNAL MEDICINE

## 2021-04-13 PROCEDURE — 3078F DIAST BP <80 MM HG: CPT | Performed by: INTERNAL MEDICINE

## 2021-04-13 RX ORDER — AMLODIPINE BESYLATE 10 MG/1
10 TABLET ORAL DAILY
Qty: 90 TABLET | Refills: 0 | Status: SHIPPED | OUTPATIENT
Start: 2021-04-13 | End: 2021-07-22

## 2021-04-13 NOTE — PROGRESS NOTES
Josue Kline  10/12/1975    No chief complaint on file. SUBJECTIVE   Josue Kline is a 50year old male who presents as a follow-up.     The patient has a history of moderate obstructive sleep apnea, as was diagnosed approximately 10 months a No Known Allergies   Past Medical History:   Diagnosis Date   • Cervicalgia    • Essential hypertension       Patient Active Problem List:     Asthma     Essential hypertension     Dyslipidemia     Erectile dysfunction due to diseases classified elsewher ordered    Hypertension:  Controlled while in office, however per home blood pressure evaluation there are several episodes of elevated/uncontrolled blood pressure. Increase amlodipine from 5 mg to 10 mg daily.   DASH lifestyle discussed  Continue current

## 2021-04-14 ENCOUNTER — PATIENT MESSAGE (OUTPATIENT)
Dept: INTERNAL MEDICINE CLINIC | Facility: CLINIC | Age: 46
End: 2021-04-14

## 2021-04-14 NOTE — TELEPHONE ENCOUNTER
From: Deya Winters  To: Tierney Bishop MD  Sent: 4/14/2021 1:59 PM CDT  Subject: Visit Jesica Loera,    I received a phone call regarding the scheduling of the sleep study. Thank you.     Concerning the testosterone level screeni

## 2021-04-20 ENCOUNTER — LAB ENCOUNTER (OUTPATIENT)
Dept: LAB | Age: 46
End: 2021-04-20
Attending: INTERNAL MEDICINE
Payer: COMMERCIAL

## 2021-04-20 DIAGNOSIS — N52.9 ERECTILE DYSFUNCTION, UNSPECIFIED ERECTILE DYSFUNCTION TYPE: ICD-10-CM

## 2021-04-20 PROCEDURE — 36415 COLL VENOUS BLD VENIPUNCTURE: CPT | Performed by: INTERNAL MEDICINE

## 2021-04-20 PROCEDURE — 84403 ASSAY OF TOTAL TESTOSTERONE: CPT | Performed by: INTERNAL MEDICINE

## 2021-05-10 ENCOUNTER — OFFICE VISIT (OUTPATIENT)
Dept: SLEEP CENTER | Age: 46
End: 2021-05-10
Attending: INTERNAL MEDICINE
Payer: COMMERCIAL

## 2021-05-10 DIAGNOSIS — G47.33 MODERATE OBSTRUCTIVE SLEEP APNEA: ICD-10-CM

## 2021-05-10 PROCEDURE — 95810 POLYSOM 6/> YRS 4/> PARAM: CPT

## 2021-07-22 RX ORDER — AMLODIPINE BESYLATE 10 MG/1
10 TABLET ORAL DAILY
Qty: 90 TABLET | Refills: 1 | Status: SHIPPED | OUTPATIENT
Start: 2021-07-22 | End: 2022-10-07

## 2021-07-22 NOTE — TELEPHONE ENCOUNTER
Last VISIT 04/13/21    Last REFILL 04/13/21 qty 90 w/0 refills    Last LABS 04/20/21 Testosterone done    No future appointments. Per PROTOCOL? Failed    Please Approve or Deny.

## 2022-02-17 ENCOUNTER — OFFICE VISIT (OUTPATIENT)
Dept: INTERNAL MEDICINE CLINIC | Facility: CLINIC | Age: 47
End: 2022-02-17
Payer: COMMERCIAL

## 2022-02-17 ENCOUNTER — TELEPHONE (OUTPATIENT)
Dept: INTERNAL MEDICINE CLINIC | Facility: CLINIC | Age: 47
End: 2022-02-17

## 2022-02-17 VITALS
OXYGEN SATURATION: 99 % | SYSTOLIC BLOOD PRESSURE: 154 MMHG | DIASTOLIC BLOOD PRESSURE: 84 MMHG | WEIGHT: 180 LBS | HEIGHT: 66.73 IN | HEART RATE: 76 BPM | RESPIRATION RATE: 16 BRPM | TEMPERATURE: 98 F | BODY MASS INDEX: 28.59 KG/M2

## 2022-02-17 DIAGNOSIS — I10 ESSENTIAL HYPERTENSION: Primary | ICD-10-CM

## 2022-02-17 DIAGNOSIS — F41.9 ANXIETY: ICD-10-CM

## 2022-02-17 DIAGNOSIS — G47.33 MODERATE OBSTRUCTIVE SLEEP APNEA: ICD-10-CM

## 2022-02-17 PROCEDURE — 3008F BODY MASS INDEX DOCD: CPT | Performed by: FAMILY MEDICINE

## 2022-02-17 PROCEDURE — 99214 OFFICE O/P EST MOD 30 MIN: CPT | Performed by: FAMILY MEDICINE

## 2022-02-17 PROCEDURE — 3079F DIAST BP 80-89 MM HG: CPT | Performed by: FAMILY MEDICINE

## 2022-02-17 PROCEDURE — 3077F SYST BP >= 140 MM HG: CPT | Performed by: FAMILY MEDICINE

## 2022-02-17 RX ORDER — HYDROCHLOROTHIAZIDE 12.5 MG/1
12.5 CAPSULE, GELATIN COATED ORAL EVERY MORNING
Qty: 90 CAPSULE | Refills: 0 | Status: SHIPPED | OUTPATIENT
Start: 2022-02-17

## 2022-02-17 RX ORDER — BUPROPION HYDROCHLORIDE 150 MG/1
150 TABLET ORAL DAILY
Qty: 30 TABLET | Refills: 0 | Status: SHIPPED | OUTPATIENT
Start: 2022-02-17

## 2022-02-17 NOTE — TELEPHONE ENCOUNTER
Future Appointments   Date Time Provider Mikala Parker   2/17/2022 10:40 AM Hong Longo MD [facility]     Patient has a significant headache this morning and his BP was 158/96. Patient states his BP's seem to be all over the place. On 2/15 it was 146/87. Patient states he was not taking his BP's for a while but feels this has been going on since November. His concern; is that his BP medication was changed from 5mg to 10mg.

## 2022-02-17 NOTE — TELEPHONE ENCOUNTER
Pt is having CPE on 3/17/22. He will need an [facility] lab order. Last CPE was 6/4/20.

## 2022-02-17 NOTE — TELEPHONE ENCOUNTER
LOV with AD 4/13/2021. Spoke to [facility]. [facility] ok with appt. Appt switched from 20 min to 40 min per [facility]. Routing to AD for fyi.

## 2022-05-26 ENCOUNTER — OFFICE VISIT (OUTPATIENT)
Dept: SLEEP CENTER | Age: 47
End: 2022-05-26
Attending: INTERNAL MEDICINE
Payer: COMMERCIAL

## 2022-05-26 DIAGNOSIS — G47.10 HYPERSOMNIA: ICD-10-CM

## 2022-05-26 DIAGNOSIS — R06.83 SNORING: ICD-10-CM

## 2022-05-26 DIAGNOSIS — G47.33 OSA (OBSTRUCTIVE SLEEP APNEA): ICD-10-CM

## 2022-05-26 DIAGNOSIS — I10 HTN (HYPERTENSION): ICD-10-CM

## 2022-05-26 PROCEDURE — 95811 POLYSOM 6/>YRS CPAP 4/> PARM: CPT

## 2022-10-07 RX ORDER — AMLODIPINE BESYLATE 10 MG/1
10 TABLET ORAL DAILY
Qty: 30 TABLET | Refills: 0 | Status: SHIPPED | OUTPATIENT
Start: 2022-10-07

## 2022-10-11 RX ORDER — AMLODIPINE BESYLATE 10 MG/1
TABLET ORAL
Qty: 90 TABLET | Refills: 0 | OUTPATIENT
Start: 2022-10-11

## 2022-11-21 RX ORDER — AMLODIPINE BESYLATE 10 MG/1
TABLET ORAL
Qty: 30 TABLET | Refills: 0 | Status: SHIPPED | OUTPATIENT
Start: 2022-11-21

## 2022-11-21 RX ORDER — AMLODIPINE BESYLATE 10 MG/1
10 TABLET ORAL DAILY
Qty: 30 TABLET | Refills: 0 | Status: SHIPPED | OUTPATIENT
Start: 2022-11-21

## 2022-11-21 NOTE — TELEPHONE ENCOUNTER
Last VISIT 02/17/22    Last CPE 06/04/20    Last REFILL 10/07/22 qty 30 w/0 refills    Last LABS No recent labs done     No future appointments. Per PROTOCOL? Failed       Please Approve or Deny.

## 2022-11-22 RX ORDER — AMLODIPINE BESYLATE 10 MG/1
TABLET ORAL
Qty: 90 TABLET | Refills: 0 | OUTPATIENT
Start: 2022-11-22

## 2023-01-19 ENCOUNTER — OFFICE VISIT (OUTPATIENT)
Dept: INTERNAL MEDICINE CLINIC | Facility: CLINIC | Age: 48
End: 2023-01-19
Payer: COMMERCIAL

## 2023-01-19 VITALS
BODY MASS INDEX: 28.56 KG/M2 | SYSTOLIC BLOOD PRESSURE: 148 MMHG | DIASTOLIC BLOOD PRESSURE: 102 MMHG | RESPIRATION RATE: 16 BRPM | WEIGHT: 182 LBS | OXYGEN SATURATION: 98 % | HEIGHT: 67 IN | HEART RATE: 86 BPM | TEMPERATURE: 98 F

## 2023-01-19 DIAGNOSIS — Z00.00 LABORATORY EXAMINATION ORDERED AS PART OF A COMPLETE PHYSICAL EXAMINATION: ICD-10-CM

## 2023-01-19 DIAGNOSIS — R10.32 CHRONIC GROIN PAIN, LEFT: ICD-10-CM

## 2023-01-19 DIAGNOSIS — Z13.29 SCREENING FOR THYROID DISORDER: ICD-10-CM

## 2023-01-19 DIAGNOSIS — G47.33 MODERATE OBSTRUCTIVE SLEEP APNEA: ICD-10-CM

## 2023-01-19 DIAGNOSIS — R10.2 PERINEUM PAIN, MALE: ICD-10-CM

## 2023-01-19 DIAGNOSIS — Z13.228 SCREENING FOR METABOLIC DISORDER: ICD-10-CM

## 2023-01-19 DIAGNOSIS — I10 ESSENTIAL HYPERTENSION: Primary | ICD-10-CM

## 2023-01-19 DIAGNOSIS — Z13.220 SCREENING FOR LIPID DISORDERS: ICD-10-CM

## 2023-01-19 DIAGNOSIS — Z13.0 SCREENING FOR BLOOD DISEASE: ICD-10-CM

## 2023-01-19 DIAGNOSIS — Z12.11 SCREEN FOR COLON CANCER: ICD-10-CM

## 2023-01-19 DIAGNOSIS — G89.29 CHRONIC GROIN PAIN, LEFT: ICD-10-CM

## 2023-01-19 DIAGNOSIS — Z12.5 SCREENING FOR PROSTATE CANCER: ICD-10-CM

## 2023-01-19 PROCEDURE — 3080F DIAST BP >= 90 MM HG: CPT | Performed by: INTERNAL MEDICINE

## 2023-01-19 PROCEDURE — 3008F BODY MASS INDEX DOCD: CPT | Performed by: INTERNAL MEDICINE

## 2023-01-19 PROCEDURE — 99215 OFFICE O/P EST HI 40 MIN: CPT | Performed by: INTERNAL MEDICINE

## 2023-01-19 PROCEDURE — 3077F SYST BP >= 140 MM HG: CPT | Performed by: INTERNAL MEDICINE

## 2023-01-19 RX ORDER — LOSARTAN POTASSIUM 100 MG/1
100 TABLET ORAL DAILY
Qty: 90 TABLET | Refills: 0 | Status: SHIPPED | OUTPATIENT
Start: 2023-01-19

## 2023-01-19 RX ORDER — TADALAFIL 5 MG/1
5 TABLET ORAL DAILY
COMMUNITY
Start: 2022-07-05

## 2023-04-13 ENCOUNTER — LAB ENCOUNTER (OUTPATIENT)
Dept: LAB | Age: 48
End: 2023-04-13
Attending: INTERNAL MEDICINE
Payer: COMMERCIAL

## 2023-04-13 DIAGNOSIS — Z00.00 LABORATORY EXAMINATION ORDERED AS PART OF A COMPLETE PHYSICAL EXAMINATION: ICD-10-CM

## 2023-04-13 DIAGNOSIS — Z13.0 SCREENING FOR BLOOD DISEASE: ICD-10-CM

## 2023-04-13 DIAGNOSIS — Z13.220 SCREENING FOR LIPID DISORDERS: ICD-10-CM

## 2023-04-13 DIAGNOSIS — Z13.228 SCREENING FOR METABOLIC DISORDER: ICD-10-CM

## 2023-04-13 DIAGNOSIS — Z13.29 SCREENING FOR THYROID DISORDER: ICD-10-CM

## 2023-04-13 DIAGNOSIS — Z12.5 SCREENING FOR PROSTATE CANCER: ICD-10-CM

## 2023-04-13 LAB
ALBUMIN SERPL-MCNC: 3.8 G/DL (ref 3.4–5)
ALBUMIN/GLOB SERPL: 1 {RATIO} (ref 1–2)
ALP LIVER SERPL-CCNC: 88 U/L
ALT SERPL-CCNC: 27 U/L
ANION GAP SERPL CALC-SCNC: 5 MMOL/L (ref 0–18)
AST SERPL-CCNC: 16 U/L (ref 15–37)
BASOPHILS # BLD AUTO: 0.02 X10(3) UL (ref 0–0.2)
BASOPHILS NFR BLD AUTO: 0.4 %
BILIRUB SERPL-MCNC: 0.6 MG/DL (ref 0.1–2)
BUN BLD-MCNC: 29 MG/DL (ref 7–18)
CALCIUM BLD-MCNC: 9.5 MG/DL (ref 8.5–10.1)
CHLORIDE SERPL-SCNC: 108 MMOL/L (ref 98–112)
CHOLEST SERPL-MCNC: 194 MG/DL (ref ?–200)
CO2 SERPL-SCNC: 23 MMOL/L (ref 21–32)
COMPLEXED PSA SERPL-MCNC: 2.07 NG/ML (ref ?–4)
CREAT BLD-MCNC: 1.31 MG/DL
EOSINOPHIL # BLD AUTO: 0.06 X10(3) UL (ref 0–0.7)
EOSINOPHIL NFR BLD AUTO: 1.3 %
ERYTHROCYTE [DISTWIDTH] IN BLOOD BY AUTOMATED COUNT: 13.2 %
FASTING PATIENT LIPID ANSWER: YES
FASTING STATUS PATIENT QL REPORTED: YES
GFR SERPLBLD BASED ON 1.73 SQ M-ARVRAT: 67 ML/MIN/1.73M2 (ref 60–?)
GLOBULIN PLAS-MCNC: 3.7 G/DL (ref 2.8–4.4)
GLUCOSE BLD-MCNC: 93 MG/DL (ref 70–99)
HCT VFR BLD AUTO: 42.4 %
HDLC SERPL-MCNC: 58 MG/DL (ref 40–59)
HGB BLD-MCNC: 14.2 G/DL
IMM GRANULOCYTES # BLD AUTO: 0.01 X10(3) UL (ref 0–1)
IMM GRANULOCYTES NFR BLD: 0.2 %
LDLC SERPL CALC-MCNC: 123 MG/DL (ref ?–100)
LYMPHOCYTES # BLD AUTO: 1.58 X10(3) UL (ref 1–4)
LYMPHOCYTES NFR BLD AUTO: 35.3 %
MCH RBC QN AUTO: 29.9 PG (ref 26–34)
MCHC RBC AUTO-ENTMCNC: 33.5 G/DL (ref 31–37)
MCV RBC AUTO: 89.3 FL
MONOCYTES # BLD AUTO: 0.5 X10(3) UL (ref 0.1–1)
MONOCYTES NFR BLD AUTO: 11.2 %
NEUTROPHILS # BLD AUTO: 2.3 X10 (3) UL (ref 1.5–7.7)
NEUTROPHILS # BLD AUTO: 2.3 X10(3) UL (ref 1.5–7.7)
NEUTROPHILS NFR BLD AUTO: 51.6 %
NONHDLC SERPL-MCNC: 136 MG/DL (ref ?–130)
OSMOLALITY SERPL CALC.SUM OF ELEC: 288 MOSM/KG (ref 275–295)
PLATELET # BLD AUTO: 199 10(3)UL (ref 150–450)
POTASSIUM SERPL-SCNC: 4.1 MMOL/L (ref 3.5–5.1)
PROT SERPL-MCNC: 7.5 G/DL (ref 6.4–8.2)
RBC # BLD AUTO: 4.75 X10(6)UL
SODIUM SERPL-SCNC: 136 MMOL/L (ref 136–145)
TRIGL SERPL-MCNC: 71 MG/DL (ref 30–149)
TSI SER-ACNC: 0.4 MIU/ML (ref 0.36–3.74)
VLDLC SERPL CALC-MCNC: 12 MG/DL (ref 0–30)
WBC # BLD AUTO: 4.5 X10(3) UL (ref 4–11)

## 2023-04-13 PROCEDURE — 84153 ASSAY OF PSA TOTAL: CPT | Performed by: INTERNAL MEDICINE

## 2023-04-13 PROCEDURE — 80050 GENERAL HEALTH PANEL: CPT | Performed by: INTERNAL MEDICINE

## 2023-04-13 PROCEDURE — 80061 LIPID PANEL: CPT | Performed by: INTERNAL MEDICINE

## 2023-04-17 DIAGNOSIS — I10 ESSENTIAL HYPERTENSION: Primary | ICD-10-CM

## 2023-04-18 RX ORDER — LOSARTAN POTASSIUM 100 MG/1
100 TABLET ORAL DAILY
Qty: 90 TABLET | Refills: 0 | Status: SHIPPED | OUTPATIENT
Start: 2023-04-18

## 2023-04-18 NOTE — TELEPHONE ENCOUNTER
Hypertension Medications Protocol Passed 04/18/2023 04:52 PM   Protocol Details  CMP or BMP in past 12 months    Last serum creatinine< 2.0    Appointment in past 6 or next 3 months     LOV 1/19/23 ad     LAST LAB  4/13/23      LAST RX 1/19/23 90     Next OV   Future Appointments   Date Time Provider Mikala Parker   4/28/2023 11:20 AM Kaley Contreras, DO [facility]   5/11/2023 10:40 AM Tim Paul MD [facility]          PROTOCOL pass

## 2023-04-28 ENCOUNTER — HOSPITAL ENCOUNTER (OUTPATIENT)
Dept: GENERAL RADIOLOGY | Age: 48
Discharge: HOME | End: 2023-04-28
Attending: PHYSICAL MEDICINE & REHABILITATION
Payer: COMMERCIAL

## 2023-04-28 ENCOUNTER — OFFICE VISIT (OUTPATIENT)
Dept: PHYSICAL MEDICINE AND REHAB | Facility: CLINIC | Age: 48
End: 2023-04-28
Payer: COMMERCIAL

## 2023-04-28 VITALS
HEART RATE: 79 BPM | DIASTOLIC BLOOD PRESSURE: 84 MMHG | BODY MASS INDEX: 28.25 KG/M2 | HEIGHT: 67 IN | SYSTOLIC BLOOD PRESSURE: 132 MMHG | WEIGHT: 180 LBS | OXYGEN SATURATION: 97 %

## 2023-04-28 DIAGNOSIS — M54.50 CHRONIC BILATERAL LOW BACK PAIN WITHOUT SCIATICA: ICD-10-CM

## 2023-04-28 DIAGNOSIS — M70.72 ILIOPSOAS BURSITIS OF LEFT HIP: ICD-10-CM

## 2023-04-28 DIAGNOSIS — G89.29 CHRONIC BILATERAL LOW BACK PAIN WITHOUT SCIATICA: ICD-10-CM

## 2023-04-28 DIAGNOSIS — M25.552 LEFT HIP PAIN: ICD-10-CM

## 2023-04-28 DIAGNOSIS — M25.552 LEFT HIP PAIN: Primary | ICD-10-CM

## 2023-04-28 PROCEDURE — 3008F BODY MASS INDEX DOCD: CPT | Performed by: PHYSICAL MEDICINE & REHABILITATION

## 2023-04-28 PROCEDURE — 3075F SYST BP GE 130 - 139MM HG: CPT | Performed by: PHYSICAL MEDICINE & REHABILITATION

## 2023-04-28 PROCEDURE — 3079F DIAST BP 80-89 MM HG: CPT | Performed by: PHYSICAL MEDICINE & REHABILITATION

## 2023-04-28 PROCEDURE — 99204 OFFICE O/P NEW MOD 45 MIN: CPT | Performed by: PHYSICAL MEDICINE & REHABILITATION

## 2023-04-28 PROCEDURE — 72110 X-RAY EXAM L-2 SPINE 4/>VWS: CPT | Performed by: PHYSICAL MEDICINE & REHABILITATION

## 2023-04-28 PROCEDURE — 73523 X-RAY EXAM HIPS BI 5/> VIEWS: CPT | Performed by: PHYSICAL MEDICINE & REHABILITATION

## 2023-04-28 RX ORDER — CYCLOBENZAPRINE HCL 10 MG
10 TABLET ORAL NIGHTLY
Qty: 30 TABLET | Refills: 0 | Status: SHIPPED | OUTPATIENT
Start: 2023-04-28

## 2023-04-28 RX ORDER — CYCLOBENZAPRINE HCL 10 MG
10 TABLET ORAL NIGHTLY
Qty: 30 TABLET | Refills: 0 | Status: SHIPPED | OUTPATIENT
Start: 2023-04-28 | End: 2023-04-28

## 2023-04-28 NOTE — PATIENT INSTRUCTIONS
Continue with your PT, I have updated a script to work on the iliopsoas muscle, its tendon, and addressing muscular imbalances in the lower extremities. Get updated Xrays today of your low back and hips. I have sent a prescription to your pharmacy for a muscle relaxer to be taken at night. See me back in about 6 weeks (or sooner if needed) at that time we may discuss iliopsoas bursa injection versus addressing the muscular tension in your low back as we discussed.

## 2023-05-09 ENCOUNTER — MED REC SCAN ONLY (OUTPATIENT)
Dept: PHYSICAL MEDICINE AND REHAB | Facility: CLINIC | Age: 48
End: 2023-05-09

## 2023-05-11 ENCOUNTER — OFFICE VISIT (OUTPATIENT)
Dept: INTERNAL MEDICINE CLINIC | Facility: CLINIC | Age: 48
End: 2023-05-11
Payer: COMMERCIAL

## 2023-05-11 VITALS
DIASTOLIC BLOOD PRESSURE: 84 MMHG | BODY MASS INDEX: 28.16 KG/M2 | HEIGHT: 67 IN | OXYGEN SATURATION: 98 % | TEMPERATURE: 98 F | HEART RATE: 81 BPM | SYSTOLIC BLOOD PRESSURE: 138 MMHG | RESPIRATION RATE: 21 BRPM | WEIGHT: 179.38 LBS

## 2023-05-11 DIAGNOSIS — I10 ESSENTIAL HYPERTENSION: ICD-10-CM

## 2023-05-11 DIAGNOSIS — G89.29 CHRONIC BILATERAL LOW BACK PAIN WITHOUT SCIATICA: ICD-10-CM

## 2023-05-11 DIAGNOSIS — M70.72 ILIOPSOAS BURSITIS OF LEFT HIP: ICD-10-CM

## 2023-05-11 DIAGNOSIS — G89.29 CHRONIC LEFT HIP PAIN: ICD-10-CM

## 2023-05-11 DIAGNOSIS — N52.1 ERECTILE DYSFUNCTION DUE TO DISEASES CLASSIFIED ELSEWHERE: ICD-10-CM

## 2023-05-11 DIAGNOSIS — M54.50 CHRONIC BILATERAL LOW BACK PAIN WITHOUT SCIATICA: ICD-10-CM

## 2023-05-11 DIAGNOSIS — J45.20 MILD INTERMITTENT ASTHMA WITHOUT COMPLICATION: ICD-10-CM

## 2023-05-11 DIAGNOSIS — M25.552 CHRONIC LEFT HIP PAIN: ICD-10-CM

## 2023-05-11 DIAGNOSIS — G47.33 MODERATE OBSTRUCTIVE SLEEP APNEA: ICD-10-CM

## 2023-05-11 DIAGNOSIS — Z00.00 ROUTINE GENERAL MEDICAL EXAMINATION AT A HEALTH CARE FACILITY: Primary | ICD-10-CM

## 2023-05-11 PROCEDURE — 3079F DIAST BP 80-89 MM HG: CPT | Performed by: INTERNAL MEDICINE

## 2023-05-11 PROCEDURE — 3075F SYST BP GE 130 - 139MM HG: CPT | Performed by: INTERNAL MEDICINE

## 2023-05-11 PROCEDURE — 99396 PREV VISIT EST AGE 40-64: CPT | Performed by: INTERNAL MEDICINE

## 2023-05-11 PROCEDURE — 3008F BODY MASS INDEX DOCD: CPT | Performed by: INTERNAL MEDICINE

## 2023-06-12 ENCOUNTER — PATIENT MESSAGE (OUTPATIENT)
Dept: INTERNAL MEDICINE CLINIC | Facility: CLINIC | Age: 48
End: 2023-06-12

## 2023-06-12 NOTE — TELEPHONE ENCOUNTER
From: Ministerio Strong  To: Alda Hernandez MD  Sent: 6/12/2023 1:09 PM CDT  Subject: Comments from Nurse Practitioner in [facility] Dr. Alexandra Bermudez,    I have been experience discomfort in my right knee for the past few months. This past weekend, the discomfort increased. Today, I went and had an observation and x-ray at [facility]. Please review the attachment to this email. During the observation, my BP was 140/100. I am not scheduled to be back in [place] until the week of July 3rd. Do you recommend I seek follow up here in [place] prior to being able to make an appointment with you?     Wes Ken

## 2023-06-13 NOTE — TELEPHONE ENCOUNTER
I recommend completing an ultrafast heart scan upon returning. I recommend to either obtain a blood pressure cuff for self evaluation, or presenting to a local immediate care facility for evaluation. If blood pressure remains elevated, I do recommend care prior to returning. Otherwise if normalizes, okay to await return.

## 2023-07-13 DIAGNOSIS — I10 ESSENTIAL HYPERTENSION: ICD-10-CM

## 2023-07-13 RX ORDER — LOSARTAN POTASSIUM 100 MG/1
100 TABLET ORAL DAILY
Qty: 90 TABLET | Refills: 0 | Status: SHIPPED | OUTPATIENT
Start: 2023-07-13

## 2023-07-13 NOTE — TELEPHONE ENCOUNTER
Last VISIT 05/11/2023    Last CPE 05/11/2023    Last REFILL  Medication Quantity Refills Start End   losartan 100 MG Oral Tab 90 tablet 0 4/18/2023    Sig:   Take 1 tablet (100 mg total) by mouth daily. Last LABS  CBC,CMP,Lipid,TSH w Reflex, PSA- 04/13/2023    No future appointments. Per PROTOCOL? Met- Refill pended    Please Approve or Deny.

## 2023-10-26 DIAGNOSIS — I10 ESSENTIAL HYPERTENSION: ICD-10-CM

## 2023-10-26 RX ORDER — LOSARTAN POTASSIUM 100 MG/1
100 TABLET ORAL DAILY
Qty: 90 TABLET | Refills: 0 | Status: SHIPPED | OUTPATIENT
Start: 2023-10-26

## 2023-12-01 ENCOUNTER — OFFICE VISIT (OUTPATIENT)
Dept: PHYSICAL MEDICINE AND REHAB | Facility: CLINIC | Age: 48
End: 2023-12-01
Payer: COMMERCIAL

## 2023-12-01 VITALS — BODY MASS INDEX: 28.09 KG/M2 | WEIGHT: 179 LBS | HEIGHT: 67 IN

## 2023-12-01 DIAGNOSIS — M79.18 RIGHT BUTTOCK PAIN: ICD-10-CM

## 2023-12-01 DIAGNOSIS — S76.812D STRAIN OF LEFT ILIOPSOAS MUSCLE, SUBSEQUENT ENCOUNTER: ICD-10-CM

## 2023-12-01 DIAGNOSIS — M79.18 GLUTEAL PAIN: Primary | ICD-10-CM

## 2023-12-01 RX ORDER — CYCLOBENZAPRINE HCL 10 MG
10 TABLET ORAL NIGHTLY
Qty: 30 TABLET | Refills: 0 | Status: SHIPPED | OUTPATIENT
Start: 2023-12-01

## 2023-12-01 NOTE — PROGRESS NOTES
RETURN PATIENT VISIT    CHIEF COMPLAINT  Low back pain    INTERVAL HISTORY  Marie Hernandez is a 46year old who was last seen in clinic on 4/20/2023, at that visit x-rays of lumbar spine were obtained which were reviewed in full below. The patient additionally was sent to physical therapy. Back in April he was primarily complaining of left hip and right-sided low back pain for the last few years as well as Left groin and buttock pain. There is a question of iliopsoas bursitis leading to his groin and low back pain, he was recommended to follow-up with me if his pain complaints persisted. He was started on a muscle relaxer at last visit as well. Today he states that most of his pain is in the right side of the low back as well as bilateral lateral hip and groin aching pain. He endorses some numbness and tingling in the left big toe when stretching. Denies significant weakness currently rates his pain 6/10. He is currently using ibuprofen as needed. He did participate in some physical therapy while in [place] recently. Still describes right sided pain in the groin and inner thighs. REVIEW OF SYSTEMS  Review of systems was completed with the patient today as pertinent to today's visit    PHYSICAL EXAMINATION  CONSTITUTIONAL: Well-appearing, in no apparent distress  EYES: No scleral icterus or conjunctival hemorrhage  CARDIOVASCULAR: Skin warm and well-perfused, no peripheral edema  RESPIRATORY: Breathing unlabored without accessory muscle use  PSYCHIATRIC: Alert, cooperative, appropriate mood and affect  SKIN: No lesions or rashes on exposed skin  MUSCULOSKELETAL: Tenderness palpation of the left gluteal musculature, good range of motion lumbar spine in flexion extension mild exacerbation of low back pain with forward flexion  NEUROLOGIC: Well-maintained strength sensation in the bilateral lower extremities. Reflexes are intact and symmetric.     REVIEW OF PRIOR X-RAYS/STUDIES  Independently reviewed the plain film radiographs of the lumbar spine and hip dated 4/28/2023. X-rays of the lumbar spine reveal no significant osseous abnormality, very minimal degenerative changes present. X-rays of the hip bilaterally reveal very mild degenerative changes of sacroiliac joints and hips. No acute abnormality. IMPRESSION/DIAGNOSIS  1. Encounter Diagnoses   Name Primary? Gluteal pain Yes    Right buttock pain     Strain of left iliopsoas muscle, subsequent encounter        TREATMENT/PLAN  Trial muscle relaxer at night, will change physical therapy location to work on more aggressive myofascial treatments, dry needling, cupping as well as development of a more aggressive home exercise plan. Education was provided regarding the above impression/diagnosis and treatment options/plan were discussed. All questions were answered during today's visit. Patient will contact clinic if any other questions or concerns.     Mary aMtute DO  Physical Medicine and Rehabilitation / [facility]

## 2023-12-11 ENCOUNTER — MED REC SCAN ONLY (OUTPATIENT)
Dept: PHYSICAL MEDICINE AND REHAB | Facility: CLINIC | Age: 48
End: 2023-12-11

## 2024-01-26 ENCOUNTER — MED REC SCAN ONLY (OUTPATIENT)
Dept: PHYSICAL MEDICINE AND REHAB | Facility: CLINIC | Age: 49
End: 2024-01-26

## 2024-01-26 ENCOUNTER — OFFICE VISIT (OUTPATIENT)
Dept: PHYSICAL MEDICINE AND REHAB | Facility: CLINIC | Age: 49
End: 2024-01-26
Payer: COMMERCIAL

## 2024-01-26 VITALS — WEIGHT: 179 LBS | HEIGHT: 67 IN | BODY MASS INDEX: 28.09 KG/M2

## 2024-01-26 DIAGNOSIS — M54.50 CHRONIC BILATERAL LOW BACK PAIN WITHOUT SCIATICA: Primary | ICD-10-CM

## 2024-01-26 DIAGNOSIS — G89.29 CHRONIC BILATERAL LOW BACK PAIN WITHOUT SCIATICA: Primary | ICD-10-CM

## 2024-01-26 DIAGNOSIS — R10.30 INGUINAL PAIN, UNSPECIFIED LATERALITY: ICD-10-CM

## 2024-01-26 PROCEDURE — 99214 OFFICE O/P EST MOD 30 MIN: CPT | Performed by: PHYSICAL MEDICINE & REHABILITATION

## 2024-01-26 PROCEDURE — 3008F BODY MASS INDEX DOCD: CPT | Performed by: PHYSICAL MEDICINE & REHABILITATION

## 2024-01-26 NOTE — PROGRESS NOTES
RETURN PATIENT VISIT    CHIEF COMPLAINT  Low back and groin pain, buttock pain     INTERVAL HISTORY  Jeffrey M Fritsch is a 48 year old who was last seen in clinic on 12/1/2023 complaining of bilateral low back and buttock and hamstring tightness and discomfort.  Patient continues to endorse tension and tightness in the low back with extension through the bilateral groin.  He feels some pressure in the right groin.  He has been participating extensively in physical therapy with dry needling and other myofascial treatments.  He endorses some improvement in his right-sided low back pain however most of his tension and stiffness and pain complaints persist.    He completed a course of cyclobenzaprine without relief.  He was previously feeling some weakness and pain in the right knee however this also improved with therapy.  Overall very frustrated with his course as his pain, stiffness and tension in the low back has been refractory to conservative treatments thus far.    Prior MRI was largely unrevealing however this was over 2 years ago.    He continues with home exercises.     REVIEW OF SYSTEMS  Review of systems was completed with the patient today as pertinent to today's visit    PHYSICAL EXAMINATION  CONSTITUTIONAL: Well-appearing, in no apparent distress  EYES: No scleral icterus or conjunctival hemorrhage  CARDIOVASCULAR: Skin warm and well-perfused, no peripheral edema  RESPIRATORY: Breathing unlabored without accessory muscle use  PSYCHIATRIC: Alert, cooperative, appropriate mood and affect  SKIN: No lesions or rashes on exposed skin  MUSCULOSKELETAL: Nonantalgic gait, stable examination from previous visits.  No new musculoskeletal or focal neurodeficits.    REVIEW OF PRIOR X-RAYS/STUDIES  Independently reviewed the plain film radiographs of the lumbar spine and hip dated 4/28/2023.     X-rays of the lumbar spine reveal no significant osseous abnormality, very minimal degenerative changes present.     X-rays  of the hip bilaterally reveal very mild degenerative changes of sacroiliac joints and hips.  No acute abnormality.       IMPRESSION/DIAGNOSIS  1.    Encounter Diagnoses   Name Primary?    Chronic bilateral low back pain without sciatica Yes    Inguinal pain, unspecified laterality          TREATMENT/PLAN  Patient is been doing this pain for the last 2 and half years, he has had extensive conservative treatments including oral courses of physical therapy, muscle relaxers, home exercises and unrevealing x-rays thus far.  I ordered an MRI of the pelvis as well as an MRI of the lumbar spine.  Will follow-up after these MRIs are completed.    Education was provided regarding the above impression/diagnosis and treatment options/plan were discussed.  All questions were answered during today's visit.  Patient will contact clinic if any other questions or concerns.        Noe Blanco DO  Physical Medicine and Rehabilitation  Interventional Spine and Sports Medicine   [facility]

## 2024-02-02 ENCOUNTER — APPOINTMENT (OUTPATIENT)
Dept: UROLOGY | Age: 49
End: 2024-02-02

## 2024-02-15 ENCOUNTER — HOSPITAL ENCOUNTER (OUTPATIENT)
Dept: MRI IMAGING | Age: 49
Discharge: HOME | End: 2024-02-15
Attending: PHYSICAL MEDICINE & REHABILITATION
Payer: COMMERCIAL

## 2024-02-15 ENCOUNTER — TELEPHONE (OUTPATIENT)
Dept: PHYSICAL MEDICINE AND REHAB | Facility: CLINIC | Age: 49
End: 2024-02-15

## 2024-02-15 DIAGNOSIS — R10.30 INGUINAL PAIN, UNSPECIFIED LATERALITY: ICD-10-CM

## 2024-02-15 DIAGNOSIS — M54.50 CHRONIC BILATERAL LOW BACK PAIN WITHOUT SCIATICA: ICD-10-CM

## 2024-02-15 DIAGNOSIS — G89.29 CHRONIC BILATERAL LOW BACK PAIN WITHOUT SCIATICA: ICD-10-CM

## 2024-02-15 PROCEDURE — 72148 MRI LUMBAR SPINE W/O DYE: CPT | Performed by: PHYSICAL MEDICINE & REHABILITATION

## 2024-02-15 PROCEDURE — 72195 MRI PELVIS W/O DYE: CPT | Performed by: PHYSICAL MEDICINE & REHABILITATION

## 2024-02-15 NOTE — TELEPHONE ENCOUNTER
----- Message from Noe Blanco DO sent at 2/15/2024  1:27 PM CST -----  Please notify patient of results and set up for a video visit to discuss.      MRI of the lumbar spine largely unrevealing for any neural compromise or significant degenerative change.  There is a left greater than right facet degeneration with some synovitis at the L5-S1 level.

## 2024-02-22 ENCOUNTER — TELEMEDICINE (OUTPATIENT)
Dept: PHYSICAL MEDICINE AND REHAB | Facility: CLINIC | Age: 49
End: 2024-02-22
Payer: COMMERCIAL

## 2024-02-22 ENCOUNTER — TELEPHONE (OUTPATIENT)
Dept: PHYSICAL MEDICINE AND REHAB | Facility: CLINIC | Age: 49
End: 2024-02-22

## 2024-02-22 DIAGNOSIS — M79.18 PIRIFORMIS MUSCLE PAIN: Primary | ICD-10-CM

## 2024-02-22 DIAGNOSIS — M79.18 BUTTOCK PAIN: ICD-10-CM

## 2024-02-22 RX ORDER — TRIAMCINOLONE ACETONIDE 40 MG/ML
40 INJECTION, SUSPENSION INTRA-ARTICULAR; INTRAMUSCULAR ONCE
Status: COMPLETED | OUTPATIENT
Start: 2024-02-22 | End: 2024-02-22

## 2024-02-22 RX ORDER — LIDOCAINE HYDROCHLORIDE 10 MG/ML
5 INJECTION, SOLUTION INFILTRATION; PERINEURAL ONCE
Status: COMPLETED | OUTPATIENT
Start: 2024-02-22 | End: 2024-02-22

## 2024-02-22 RX ADMIN — TRIAMCINOLONE ACETONIDE 40 MG: 40 INJECTION, SUSPENSION INTRA-ARTICULAR; INTRAMUSCULAR at 09:33:00

## 2024-02-22 RX ADMIN — LIDOCAINE HYDROCHLORIDE 5 ML: 10 INJECTION, SOLUTION INFILTRATION; PERINEURAL at 09:32:00

## 2024-02-22 NOTE — PROCEDURES
PROCEDURE NOTE    DIAGNOSIS  1. Right posterior pelvis pain  2. Myofascial pain on right side    PROCEDURE  Ultrasound Guided Right Piriformis Injection    Performing physician  Noe Blanco DO    PROCEDURE NOTE  Informed consent was obtained. Risks and benefits of the procedure were explained. The patient was placed in a prone position. The right piriformis muscle was identified under ultrasound using the curvilinear transducer. The area was prepped with Betadine x 3 and alcohol swab. Sterile ultrasound probe cover was used. Sterile ultrasound gel was applied. A 27-gauge 1.5 inch needle was inserted into this area and 1-2 mL of 1% lidocaine was infused subcutaneously to anesthetize the region. Then, a 25 gauge 2.5 inch needle was advanced under ultrasound guidance to the target, using an in-plane approach. Then, 2 mL of 0.25% bupivicaine and 2 mL of 1% Lidocaine with 1 cc of 40 mg/cc of Kenalog was infused through a fanlike pattern in the right piriformis muscle. The patient tolerated the procedure without complications. Post procedure instructions were provided.        Noe Blanco DO  Physical Medicine and Rehabilitation  Interventional Spine and Sports Medicine   [facility]

## 2024-02-22 NOTE — TELEPHONE ENCOUNTER
Initiated authorization for Right piriformis injection with ultrasound guidance CPT/HCPCS 42436, , [id number] with Royce LIM at [facility]  Case #[id number].  [facility] requested clinicals-clinicals faxed to [phone]  Status: pending

## 2024-02-22 NOTE — PROGRESS NOTES
RETURN PATIENT VISIT    CHIEF COMPLAINT  Low back and groin pain     INTERVAL HISTORY  Jeffrey M Fritsch is a 48 year old who was last seen in clinic on 1/26/2024 following up MRI of the pelvis as well as lumbar MRI which were ordered at last visit and reviewed in full below.    He states that forward flexion he feels tension and pain in the right buttock. He has pain in the right groin with lateral movement. He has been extensively worked up with PT and dry needling without much improvement. He has been doing extensive home exercises and stretching.     He denies significant radiation of symptoms into the right lower extremity but endorses significantly limiting pain in the right buttock and right groin.    REVIEW OF SYSTEMS  Review of systems was completed with the patient today as pertinent to today's visit    PHYSICAL EXAMINATION  CONSTITUTIONAL: Well-appearing, in no apparent distress  EYES: No scleral icterus or conjunctival hemorrhage  CARDIOVASCULAR: Skin warm and well-perfused, no peripheral edema  RESPIRATORY: Breathing unlabored without accessory muscle use  PSYCHIATRIC: Alert, cooperative, appropriate mood and affect  SKIN: No lesions or rashes on exposed skin  MUSCULOSKELETAL: Significant tenderness to palpation over the right piriformis musculature with tension noted in this area, mildly tight left piriformis without pain during palpation.  Piriformis stress produces pain.  Hip range of motion failed provocative maneuvers are negative however he has pain with resisted hip flexion on the right.  NEUROLOGIC: Well-maintained strength sensation of bilateral lower extremities.    REVIEW OF PRIOR X-RAYS/STUDIES  Independently reviewed the MRI of the lumbar spine dated 2/15/2024 which reveals no significant neuroforaminal or central canal narrowing.  There is very minimal left paracentral disc bulging at L1-2 which does not appear to encroach upon any of the neural elements.  Overall unrevealing MRI lumbar  spine for the patient's complaints.    Additionally independently reviewed the MRI of the pelvis dated 2/15/2024 which reveals left greater than right degenerative changes of the hip without muscular edema or any evidence bursitis of the pelvis.    IMPRESSION/DIAGNOSIS  1.    Encounter Diagnoses   Name Primary?    Piriformis muscle pain Yes    Buttock pain          TREATMENT/PLAN  Ongoing persistent pain complaints despite extensive physical therapy and home exercises with unrevealing MRIs of the pelvis and lumbar spine.  We discussed intervening upon his symptomatic piriformis and he is interested in proceeding.  Procedure note to follow.    Additionally he will return to care to discuss right-sided iliopsoas bursa injection as this may be leading mildly Corky his complaints in the right anterior hip in the setting of largely negative MRIs.    Education was provided regarding the above impression/diagnosis and treatment options/plan were discussed.  All questions were answered during today's visit.  Patient will contact clinic if any other questions or concerns.        Noe Blanco DO  Physical Medicine and Rehabilitation  Interventional Spine and Sports Medicine   [facility]

## 2024-02-27 ENCOUNTER — PATIENT MESSAGE (OUTPATIENT)
Dept: PHYSICAL MEDICINE AND REHAB | Facility: CLINIC | Age: 49
End: 2024-02-27

## 2024-02-27 DIAGNOSIS — M70.71 ILIOPSOAS BURSITIS OF RIGHT HIP: Primary | ICD-10-CM

## 2024-02-28 NOTE — TELEPHONE ENCOUNTER
From: Jeffrey M Fritsch  To: Noe Blanco  Sent: 2/27/2024 1:56 PM CST  Subject: March 7th Appointment     Elen Blanco,    My follow up appointment is scheduled for March 7th. Per your request I’m sending notice so that the order for the injections to my right hip can be placed.    To that point, the injections to my piriformis seem to have had positive results.    Regards,  Pernell

## 2024-02-29 DIAGNOSIS — I10 ESSENTIAL HYPERTENSION: ICD-10-CM

## 2024-03-01 RX ORDER — LOSARTAN POTASSIUM 100 MG/1
100 TABLET ORAL DAILY
Qty: 90 TABLET | Refills: 0 | Status: SHIPPED | OUTPATIENT
Start: 2024-03-01

## 2024-03-05 ENCOUNTER — TELEPHONE (OUTPATIENT)
Dept: PHYSICAL MEDICINE AND REHAB | Facility: CLINIC | Age: 49
End: 2024-03-05

## 2024-03-05 NOTE — TELEPHONE ENCOUNTER
Contacted Faby MCCLENDON at [facility] who states Right piriformis injection with ultrasound guidance is still pending 15 business day turnaround  Faby confirmed clinicals were received

## 2024-03-05 NOTE — TELEPHONE ENCOUNTER
Initiated authorization for Right iliopsoas bursa injection, ultrasound guidance  CPT/HCPCS 09349,  with Faby MCCLENDON at [facility]  Case #[id number].  Status: Approved-authorization is not required per health plan however may be subject to review once claim is submitted

## 2024-03-07 ENCOUNTER — OFFICE VISIT (OUTPATIENT)
Dept: PHYSICAL MEDICINE AND REHAB | Facility: CLINIC | Age: 49
End: 2024-03-07
Payer: COMMERCIAL

## 2024-03-07 DIAGNOSIS — M70.71 ILIOPSOAS BURSITIS OF RIGHT HIP: Primary | ICD-10-CM

## 2024-03-07 NOTE — PROCEDURES
PROCEDURE NOTE    DIAGNOSIS  Right hip pain    PROCEDURE  Ultrasound guided Right hip iliopsoas bursa injection    PERFORMING PHYSICIAN  Noe Blanco DO    PROCEDURE NOTE  Informed consent was obtained. Risks and benefits of the procedure were explained. The patient was placed in a supine position and the Right femoral head neck junction, ASIS, iliopsoas muscle and iliopsoas tendon with underlying bursa or identified under ultrasound using the curvilinear transducer. The area was prepped with Betadine x 3 and alcohol swab. Sterile ultrasound probe cover was used. Sterile ultrasound gel was applied. A 27-gauge 1.5 inch needle was inserted into this area and 1-2 mL of 1% lidocaine was infused subcutaneously to anesthetize the region. Then, a 25-gauge 3.5 inch needle was advanced under ultrasound guidance to the target, using an in-plane approach. Then, 1 mL of 40 mg/mL Triamcinolone and 3 mL of 1% Lidocaine was infused. Injectate was seen expanding the peritendinous space of the iliopsoas tendon at the iliopsoas bursa. The patient tolerated the procedure without complications. Post procedure instructions were provided.        Noe Blanco DO  Physical Medicine and Rehabilitation  Interventional Spine and Sports Medicine   [facility]

## 2024-03-11 RX ORDER — LIDOCAINE HYDROCHLORIDE 10 MG/ML
5 INJECTION, SOLUTION INFILTRATION; PERINEURAL ONCE
Status: COMPLETED | OUTPATIENT
Start: 2024-03-11 | End: 2024-03-11

## 2024-03-11 RX ORDER — TRIAMCINOLONE ACETONIDE 40 MG/ML
40 INJECTION, SUSPENSION INTRA-ARTICULAR; INTRAMUSCULAR ONCE
Status: COMPLETED | OUTPATIENT
Start: 2024-03-11 | End: 2024-03-11

## 2024-03-18 NOTE — TELEPHONE ENCOUNTER
Received Denial from [facility] for Right piriformis injection with ultrasound guidance  Denial reason-There is not enough medical evidence to show that this inj as a tx for bone and muscle pain leads to long term improvements in pain scores. This inj is considered experimental investigational as is not medically necessary

## 2024-03-22 NOTE — TELEPHONE ENCOUNTER
Rcvd denial letter from [employer] via [employer] Re: Trigger Point injection due to     \" The clinical and treatment information we received did not meet medical necessity criteria\"  Placed in Neuro bin

## 2024-05-29 ENCOUNTER — OFFICE VISIT (OUTPATIENT)
Dept: PHYSICAL MEDICINE AND REHAB | Facility: CLINIC | Age: 49
End: 2024-05-29

## 2024-05-29 VITALS — HEIGHT: 67 IN | BODY MASS INDEX: 29.19 KG/M2 | WEIGHT: 186 LBS

## 2024-05-29 DIAGNOSIS — M79.18 GLUTEAL PAIN: ICD-10-CM

## 2024-05-29 DIAGNOSIS — M25.552 LEFT HIP PAIN: ICD-10-CM

## 2024-05-29 DIAGNOSIS — M79.18 RIGHT BUTTOCK PAIN: Primary | ICD-10-CM

## 2024-05-29 DIAGNOSIS — M79.18 PIRIFORMIS MUSCLE PAIN: ICD-10-CM

## 2024-05-29 PROCEDURE — 3008F BODY MASS INDEX DOCD: CPT | Performed by: PHYSICAL MEDICINE & REHABILITATION

## 2024-05-29 PROCEDURE — 99214 OFFICE O/P EST MOD 30 MIN: CPT | Performed by: PHYSICAL MEDICINE & REHABILITATION

## 2024-05-29 NOTE — PROGRESS NOTES
RETURN PATIENT VISIT    CHIEF COMPLAINT  Buttock pain  Hip pain and numbness     INTERVAL HISTORY  Jeffrey M Fritsch is a 48 year old who was last seen in clinic on 3/7/24 following up after right hip iliopsoas bursa injection.  Overall endorse about 50% improvement for the last 2 months or so.  He continues to experience bilateral low back and buttock aching pain.  He endorses new from hip area on the right side without weakness.  Currently rates his level pain 6/10.    He statest hat he has changed his bike saddle, and is not feeling as much pain in the hamstring or psoas.     He has some right-sided upper buttock pain with lateral motions.  Not functionally limited from his pain complaints but interested in strategies to minimize or reduce the pain complaints in the gluteal muscles on the side.    Currently uses ibuprofen on an as-needed basis.    REVIEW OF SYSTEMS  Review of systems was completed with the patient today as pertinent to today's visit    PHYSICAL EXAMINATION  CONSTITUTIONAL: Well-appearing, in no apparent distress  EYES: No scleral icterus or conjunctival hemorrhage  CARDIOVASCULAR: Skin warm and well-perfused, no peripheral edema  RESPIRATORY: Breathing unlabored without accessory muscle use  PSYCHIATRIC: Alert, cooperative, appropriate mood and affect  SKIN: No lesions or rashes on exposed skin  MUSCULOSKELETAL: He has good range of motion of lumbar spine in flexion and extension, facet loading is negative.  He has tenderness to palpation over the movement of his glue medius on the right side without previously persistent tenderness in the piriformis muscle on the right side.    Well-maintained strength and sensation in the bilateral lower extremities.  Sacroiliac joint provocative maneuvers are negative on today's examination.      REVIEW OF PRIOR X-RAYS/STUDIES  Independently reviewed the MRI of the lumbar spine dated 2/15/2024 which reveals no significant neuroforaminal or central canal  narrowing.  There is very minimal left paracentral disc bulging at L1-2 which does not appear to encroach upon any of the neural elements.  Overall unrevealing MRI lumbar spine for the patient's complaints.     Additionally independently reviewed the MRI of the pelvis dated 2/15/2024 which reveals left greater than right degenerative changes of the hip without muscular edema or any evidence bursitis of the pelvis.    IMPRESSION/DIAGNOSIS  1.    Encounter Diagnoses   Name Primary?    Right buttock pain Yes    Left hip pain     Piriformis muscle pain     Gluteal pain          TREATMENT/PLAN  Patient will continue with activity modification, I encouraged him to check the fit of his Peloton bike as well as to continue to work on physical therapy type exercises myofascial treatments and release including dry needling.  Consideration for ultrasound-guided gluteus medius/gluteus minimus trigger point injection if his pain remains refractory.  Overall he is making improvements albeit slowly.    Education was provided regarding the above impression/diagnosis and treatment options/plan were discussed.  All questions were answered during today's visit.  Patient will contact clinic if any other questions or concerns.          Noe Blanco DO  Interventional Spine and Sports Medicine Specialist   Physical Medicine and Rehabilitation  [address] [address]    [address] [place] [address]

## 2024-07-17 ENCOUNTER — TELEPHONE (OUTPATIENT)
Dept: INTERNAL MEDICINE CLINIC | Facility: CLINIC | Age: 49
End: 2024-07-17

## 2024-07-17 DIAGNOSIS — I10 ESSENTIAL HYPERTENSION: ICD-10-CM

## 2024-07-22 RX ORDER — LOSARTAN POTASSIUM 100 MG/1
100 TABLET ORAL DAILY
Qty: 30 TABLET | Refills: 0 | Status: SHIPPED | OUTPATIENT
Start: 2024-07-22

## 2024-07-22 NOTE — TELEPHONE ENCOUNTER
Please review.  Protocol failed / Has no protocol.    [employer] message sent to patient to schedule an office visit with Primary Care Provider.   Will also route to front office to make a phone attempt.     Requested Prescriptions   Pending Prescriptions Disp Refills    losartan 100 MG Oral Tab [Pharmacy Med Name: LOSARTAN 100MG TABLETS] 30 tablet 0     Sig: Take 1 tablet (100 mg total) by mouth daily. **Appointment needed for further refills.       Hypertension Medications Protocol Failed - 7/17/2024  6:10 PM        Failed - CMP or BMP in past 12 months        Failed - In person appointment or virtual visit in the past 12 mos or appointment in next 3 mos     Recent Outpatient Visits              1 month ago Right buttock pain    [facility], [address] [place] [address] [place],     Office Visit    4 months ago Iliopsoas bursitis of right hip    [facility], [address] [place] [address] [place], DO    Office Visit    5 months ago Piriformis muscle pain    [facility] [address] [place] [address] Noe Blanco, DO    Telemedicine    5 months ago Chronic bilateral low back pain without sciatica    [facility], [address] [place] [address] [place],     Office Visit    7 months ago Gluteal pain    [address] [place], DO    Office Visit                      Failed - EGFRCR or GFRNAA > 50     GFR Evaluation            Passed - Last BP reading less than 140/90     BP Readings from Last 1 Encounters:   05/11/23 138/84                    Recent Outpatient Visits              1 month ago Right buttock pain    [facility], [address] [place] [address] [place],     Office Visit    4 months ago Iliopsoas bursitis of right hip    [facility] [address] [place] [address] [place], DO    Office Visit    5 months ago Piriformis muscle pain    [facility], [facility] Anjelica Pantoja Joseph, DO    Telemedicine    5 months ago Chronic bilateral low back pain without sciatica    [facility], [facility] Anjelica Pantoja Joseph,     Office Visit    7 months ago Gluteal pain    [facility], [facility] Anjelica Pantoja Joseph,     Office Visit

## 2024-09-03 DIAGNOSIS — Z00.00 ROUTINE GENERAL MEDICAL EXAMINATION AT A HEALTH CARE FACILITY: Primary | ICD-10-CM

## 2024-09-03 DIAGNOSIS — Z13.0 SCREENING FOR BLOOD DISEASE: ICD-10-CM

## 2024-09-03 DIAGNOSIS — Z13.220 SCREENING FOR LIPID DISORDERS: ICD-10-CM

## 2024-09-03 DIAGNOSIS — Z13.228 SCREENING FOR METABOLIC DISORDER: ICD-10-CM

## 2024-09-03 DIAGNOSIS — Z13.29 SCREENING FOR THYROID DISORDER: ICD-10-CM

## 2024-09-06 DIAGNOSIS — I10 ESSENTIAL HYPERTENSION: ICD-10-CM

## 2024-09-10 RX ORDER — LOSARTAN POTASSIUM 100 MG/1
100 TABLET ORAL DAILY
Qty: 30 TABLET | Refills: 0 | Status: SHIPPED | OUTPATIENT
Start: 2024-09-10

## 2024-09-10 NOTE — TELEPHONE ENCOUNTER
Please review. Protocol Failed; No Protocol    Future Appointments   Date Time Provider Department Center   10/3/2024  1:00 PM Tim Paul MD [facility]         Requested Prescriptions   Pending Prescriptions Disp Refills    LOSARTAN 100 MG Oral Tab [Pharmacy Med Name: LOSARTAN 100MG TABLETS] 30 tablet 0     Sig: TAKE 1 TABLET BY MOUTH DAILY       Hypertension Medications Protocol Failed - 9/6/2024 11:28 AM        Failed - CMP or BMP in past 12 months        Failed - EGFRCR or GFRNAA > 50     GFR Evaluation            Passed - Last BP reading less than 140/90     BP Readings from Last 1 Encounters:   05/11/23 138/84               Passed - In person appointment or virtual visit in the past 12 mos or appointment in next 3 mos     Recent Outpatient Visits              3 months ago Right buttock pain    [facility], [address] [place] [address] [place]    Office Visit    6 months ago Iliopsoas bursitis of right hip    [address] [place] Noe Blanco DO    Office Visit    6 months ago Piriformis muscle pain    [address] [place] Noe Blanco DO    Telemedicine    7 months ago Chronic bilateral low back pain without sciatica    [address] [place] Noe Blanco,     Office Visit    9 months ago Gluteal pain    [address] [place] Noe Blanco DO    Office Visit          Future Appointments         Provider Department Appt Notes    In 3 weeks Tim Paul MD [address] physical - last 5/2023                           Future Appointments         Provider Department Appt Notes    In 3 weeks Tim Paul MD [address] physical - last 5/2023          Recent Outpatient Visits              3 months ago Right buttock pain    [address]  [place], Noe Dahl, DO    Office Visit    6 months ago Iliopsoas bursitis of right hip    [facility], [address] [place] [address], [place],     Office Visit    6 months ago Piriformis muscle pain    [facility], [address] [place], [place] [address] Joseph, DO    Telemedicine    7 months ago Chronic bilateral low back pain without sciatica    [facility], [address] [place] [address], [place],     Office Visit    9 months ago Gluteal pain    [facility], [address] [place] [address] [place],     Office Visit

## 2024-09-27 ENCOUNTER — LAB ENCOUNTER (OUTPATIENT)
Dept: LAB | Age: 49
End: 2024-09-27
Attending: INTERNAL MEDICINE
Payer: COMMERCIAL

## 2024-09-27 DIAGNOSIS — Z13.0 SCREENING FOR BLOOD DISEASE: ICD-10-CM

## 2024-09-27 DIAGNOSIS — Z13.220 SCREENING FOR LIPID DISORDERS: ICD-10-CM

## 2024-09-27 DIAGNOSIS — Z13.29 SCREENING FOR THYROID DISORDER: ICD-10-CM

## 2024-09-27 DIAGNOSIS — Z00.00 ROUTINE GENERAL MEDICAL EXAMINATION AT A HEALTH CARE FACILITY: ICD-10-CM

## 2024-09-27 DIAGNOSIS — Z13.228 SCREENING FOR METABOLIC DISORDER: ICD-10-CM

## 2024-09-27 LAB
ALBUMIN SERPL-MCNC: 4 G/DL (ref 3.2–4.8)
ALBUMIN/GLOB SERPL: 1.4 {RATIO} (ref 1–2)
ALP LIVER SERPL-CCNC: 88 U/L
ALT SERPL-CCNC: 23 U/L
ANION GAP SERPL CALC-SCNC: 5 MMOL/L (ref 0–18)
AST SERPL-CCNC: 21 U/L (ref ?–34)
BASOPHILS # BLD AUTO: 0.02 X10(3) UL (ref 0–0.2)
BASOPHILS NFR BLD AUTO: 0.4 %
BILIRUB SERPL-MCNC: 0.6 MG/DL (ref 0.3–1.2)
BUN BLD-MCNC: 18 MG/DL (ref 9–23)
CALCIUM BLD-MCNC: 10.2 MG/DL (ref 8.7–10.4)
CHLORIDE SERPL-SCNC: 108 MMOL/L (ref 98–112)
CHOLEST SERPL-MCNC: 186 MG/DL (ref ?–200)
CO2 SERPL-SCNC: 25 MMOL/L (ref 21–32)
CREAT BLD-MCNC: 1.23 MG/DL
EGFRCR SERPLBLD CKD-EPI 2021: 72 ML/MIN/1.73M2 (ref 60–?)
EOSINOPHIL # BLD AUTO: 0.06 X10(3) UL (ref 0–0.7)
EOSINOPHIL NFR BLD AUTO: 1.3 %
ERYTHROCYTE [DISTWIDTH] IN BLOOD BY AUTOMATED COUNT: 13 %
FASTING PATIENT LIPID ANSWER: YES
FASTING STATUS PATIENT QL REPORTED: YES
GLOBULIN PLAS-MCNC: 2.9 G/DL (ref 2–3.5)
GLUCOSE BLD-MCNC: 77 MG/DL (ref 70–99)
HCT VFR BLD AUTO: 39.9 %
HDLC SERPL-MCNC: 43 MG/DL (ref 40–59)
HGB BLD-MCNC: 13.7 G/DL
IMM GRANULOCYTES # BLD AUTO: 0.02 X10(3) UL (ref 0–1)
IMM GRANULOCYTES NFR BLD: 0.4 %
LDLC SERPL CALC-MCNC: 129 MG/DL (ref ?–100)
LYMPHOCYTES # BLD AUTO: 1.57 X10(3) UL (ref 1–4)
LYMPHOCYTES NFR BLD AUTO: 34.1 %
MCH RBC QN AUTO: 29.6 PG (ref 26–34)
MCHC RBC AUTO-ENTMCNC: 34.3 G/DL (ref 31–37)
MCV RBC AUTO: 86.2 FL
MONOCYTES # BLD AUTO: 0.49 X10(3) UL (ref 0.1–1)
MONOCYTES NFR BLD AUTO: 10.6 %
NEUTROPHILS # BLD AUTO: 2.45 X10 (3) UL (ref 1.5–7.7)
NEUTROPHILS # BLD AUTO: 2.45 X10(3) UL (ref 1.5–7.7)
NEUTROPHILS NFR BLD AUTO: 53.2 %
NONHDLC SERPL-MCNC: 143 MG/DL (ref ?–130)
OSMOLALITY SERPL CALC.SUM OF ELEC: 287 MOSM/KG (ref 275–295)
PLATELET # BLD AUTO: 166 10(3)UL (ref 150–450)
POTASSIUM SERPL-SCNC: 3.8 MMOL/L (ref 3.5–5.1)
PROT SERPL-MCNC: 6.9 G/DL (ref 5.7–8.2)
RBC # BLD AUTO: 4.63 X10(6)UL
SODIUM SERPL-SCNC: 138 MMOL/L (ref 136–145)
T3FREE SERPL-MCNC: 3.46 PG/ML (ref 2.4–4.2)
T4 FREE SERPL-MCNC: 0.9 NG/DL (ref 0.8–1.7)
TRIGL SERPL-MCNC: 78 MG/DL (ref 30–149)
TSI SER-ACNC: 0.41 MIU/ML (ref 0.55–4.78)
VLDLC SERPL CALC-MCNC: 14 MG/DL (ref 0–30)
WBC # BLD AUTO: 4.6 X10(3) UL (ref 4–11)

## 2024-09-27 PROCEDURE — 80050 GENERAL HEALTH PANEL: CPT | Performed by: INTERNAL MEDICINE

## 2024-09-27 PROCEDURE — 84481 FREE ASSAY (FT-3): CPT | Performed by: INTERNAL MEDICINE

## 2024-09-27 PROCEDURE — 84439 ASSAY OF FREE THYROXINE: CPT | Performed by: INTERNAL MEDICINE

## 2024-09-27 PROCEDURE — 80061 LIPID PANEL: CPT | Performed by: INTERNAL MEDICINE

## 2024-10-03 ENCOUNTER — OFFICE VISIT (OUTPATIENT)
Dept: INTERNAL MEDICINE CLINIC | Facility: CLINIC | Age: 49
End: 2024-10-03
Payer: COMMERCIAL

## 2024-10-03 VITALS
SYSTOLIC BLOOD PRESSURE: 132 MMHG | DIASTOLIC BLOOD PRESSURE: 78 MMHG | WEIGHT: 189 LBS | TEMPERATURE: 98 F | HEART RATE: 76 BPM | OXYGEN SATURATION: 99 % | HEIGHT: 67 IN | BODY MASS INDEX: 29.66 KG/M2

## 2024-10-03 DIAGNOSIS — J45.20 MILD INTERMITTENT ASTHMA WITHOUT COMPLICATION (HCC): ICD-10-CM

## 2024-10-03 DIAGNOSIS — G47.33 MODERATE OBSTRUCTIVE SLEEP APNEA: ICD-10-CM

## 2024-10-03 DIAGNOSIS — G57.01 PIRIFORMIS SYNDROME, RIGHT: ICD-10-CM

## 2024-10-03 DIAGNOSIS — G89.29 CHRONIC RIGHT HIP PAIN: ICD-10-CM

## 2024-10-03 DIAGNOSIS — M25.551 CHRONIC RIGHT HIP PAIN: ICD-10-CM

## 2024-10-03 DIAGNOSIS — Z12.11 SCREEN FOR COLON CANCER: Primary | ICD-10-CM

## 2024-10-03 DIAGNOSIS — I10 ESSENTIAL HYPERTENSION: ICD-10-CM

## 2024-10-03 DIAGNOSIS — R79.89 ABNORMAL THYROID BLOOD TEST: ICD-10-CM

## 2024-10-03 RX ORDER — LOSARTAN POTASSIUM 50 MG/1
50 TABLET ORAL DAILY
Qty: 90 TABLET | Refills: 1 | Status: SHIPPED | OUTPATIENT
Start: 2024-10-03

## 2024-10-03 RX ORDER — LOSARTAN POTASSIUM 100 MG/1
100 TABLET ORAL DAILY
Qty: 30 TABLET | Refills: 0 | Status: CANCELLED | OUTPATIENT
Start: 2024-10-03

## 2024-10-03 NOTE — PROGRESS NOTES
Jeffrey M Fritsch  10/12/1975    Chief Complaint   Patient presents with    Physical     Rm 7 SS. No concerns.        HPI:   Jeffrey M Fritsch is a 48 year old male who presents for an annual physical examination.    Since last evaluation the patient has made persistent efforts to improve her right hip pain.   injected steroid therapy, and physical therapy have not offered improvement.  He is undergoing self-directed physical therapy, with what he feels is finally some degree of improvement.  No further acute concerns at this time.    Current Outpatient Medications   Medication Sig Dispense Refill    losartan 50 MG Oral Tab Take 1 tablet (50 mg total) by mouth daily. 90 tablet 1    tadalafil 5 MG Oral Tab Take 5 mg by mouth daily. (Patient not taking: Reported on 10/3/2024)      Albuterol Sulfate HFA (PROAIR HFA) 108 (90 BASE) MCG/ACT Inhalation Aero Soln Inhale 2 puffs into the lungs every 6 (six) hours as needed for Wheezing. (Patient not taking: Reported on 10/3/2024) 1 Inhaler 1      No Known Allergies   Past Medical History:    Anxiety    Cervicalgia    Depression    Essential hypertension    KERWIN (obstructive sleep apnea)    AHI 8.4 Supine AHI 5.4  Sao2 Valdemar 82.7%       Patient Active Problem List   Diagnosis    Asthma (HCC)    Essential hypertension    Dyslipidemia    Erectile dysfunction due to diseases classified elsewhere    Chronic left hip pain    Iliopsoas bursitis of left hip    Chronic bilateral low back pain without sciatica    Moderate obstructive sleep apnea      Past Surgical History:   Procedure Laterality Date    Other surgical history  2004    spermatic cord excision of varicocele    Vasectomy  2010      Family History   Problem Relation Age of Onset    Heart Attack Maternal Grandfather     Asthma Brother     Other (CHF) Maternal Grandmother     High Cholesterol Mother     Hypertension Mother     Hypertension Brother       Social History     Socioeconomic History    Marital status: Single    Tobacco Use    Smoking status: Never    Smokeless tobacco: Never   Vaping Use    Vaping status: Never Used   Substance and Sexual Activity    Alcohol use: Yes     Alcohol/week: 1.0 standard drink of alcohol     Types: 1 Standard drinks or equivalent per week     Comment: CAGe 6/4/20    Drug use: No    Sexual activity: Yes     Partners: Female   Other Topics Concern    Caffeine Concern Yes    Exercise No     Social Determinants of Health      [facility]    Social Connections    [facility]    Housing Stability         REVIEW OF SYSTEMS:   GENERAL: feels well otherwise  SKIN: no rashes  EYES:denies blurred vision or double vision  HEENT: Not bilateral congested  LUNGS: denies shortness of breath with exertion  CARDIOVASCULAR: denies chest pain on exertion  GI: no nausea or abdominal pain  NEURO: denies headaches    EXAM:   /78   Pulse 76   Temp 97.6 °F (36.4 °C) (Temporal)   Ht 5' 7\" (1.702 m)   Wt 189 lb (85.7 kg)   SpO2 99%   BMI 29.60 kg/m²   GENERAL: Well developed, well nourished,in no apparent distress  SKIN: No rashes,no suspicious lesions  EYES: Tympanic membrane within normal limits bilaterally.  Conjunctiva are clear  HEENT: atraumatic, normocephalic.  NECK: supple,no adenopathy,no bruits  LUNGS: clear to auscultation  CARDIO: RRR without murmur  GI: good BS's,no masses, HSM or tenderness    ASSESSMENT AND PLAN:   Jeffrey M Fritsch is a 48 year old male who presents for an annual physical examination.      Outstanding screening and preventive measures:   Tetanus immunization: Tdap administered today  Influenza immunization: administered today  Screening for colon cancer: Referred to GI service   Screening for prostate cancer: PSA next year  Pneumococcal and shingles immunizations: to be obtained in near future    KERWIN:  Improved and controlled with NiPPV  Following  with sleep service    Hypertension:  Upper normal as has not been taking losartan; reduce dose from 100 mg daily to 50 mg daily    Chronic right hip pain:  Piriformis syndrome  Literature of stretching exercises provided  In light of symptom chronicity and intensity I have referred the patient to the ortho service    Asthma:  Mild, intermittent  Controlled, with no further inhaled myra use  Pneumococcal immunization advised    Subclinical hyperthyroidism:  Asymptomatic  TSH in 3 months    The patient indicates understanding of these issues and agrees to the plan.    TODAY'S ORDERS     Orders Placed This Encounter   Procedures    TSH W Reflex To Free T4 [E]    TdaP (Adacel, Boostrix) [83063]    Fluzone trivalent vaccine, PF 0.5mL, 6mo+ (46128)       Meds & Refills:  Requested Prescriptions     Signed Prescriptions Disp Refills    losartan 50 MG Oral Tab 90 tablet 1     Sig: Take 1 tablet (50 mg total) by mouth daily.       Imaging & Consults:  GASTRO - INTERNAL  ORTHOPEDIC - INTERNAL  TETANUS, DIPHTHERIA TOXOIDS AND ACELLULAR PERTUSIS VACCINE (TDAP), >7 YEARS, IM USE  INFLUENZA VACCINE, TRI, PRESERV FREE, 0.5 ML    No follow-ups on file.  There are no Patient Instructions on file for this visit.    All questions were answered and the patient agrees with the plan.     Thank you,  Tim Paul MD

## 2024-12-10 ENCOUNTER — APPOINTMENT (OUTPATIENT)
Dept: CT IMAGING | Age: 49
End: 2024-12-10
Attending: PHYSICIAN ASSISTANT
Payer: COMMERCIAL

## 2024-12-10 ENCOUNTER — HOSPITAL ENCOUNTER (OUTPATIENT)
Age: 49
Discharge: HOME | End: 2024-12-10
Attending: EMERGENCY MEDICINE
Payer: COMMERCIAL

## 2024-12-10 VITALS
TEMPERATURE: 99 F | OXYGEN SATURATION: 98 % | DIASTOLIC BLOOD PRESSURE: 82 MMHG | HEART RATE: 82 BPM | RESPIRATION RATE: 18 BRPM | SYSTOLIC BLOOD PRESSURE: 138 MMHG

## 2024-12-10 DIAGNOSIS — E07.9 THYROID MASS: ICD-10-CM

## 2024-12-10 DIAGNOSIS — R22.1 LOCALIZED SWELLING, MASS AND LUMP, NECK: Primary | ICD-10-CM

## 2024-12-10 LAB
#MXD IC: 0.8 X10ˆ3/UL (ref 0.1–1)
BUN BLD-MCNC: 25 MG/DL (ref 7–18)
CHLORIDE BLD-SCNC: 105 MMOL/L (ref 98–112)
CO2 BLD-SCNC: 23 MMOL/L (ref 21–32)
CREAT BLD-MCNC: 1.3 MG/DL
EGFRCR SERPLBLD CKD-EPI 2021: 67 ML/MIN/1.73M2 (ref 60–?)
GLUCOSE BLD-MCNC: 110 MG/DL (ref 70–99)
HCT VFR BLD AUTO: 43.7 %
HCT VFR BLD CALC: 44 %
HGB BLD-MCNC: 14.5 G/DL
ISTAT IONIZED CALCIUM FOR CHEM 8: 1.28 MMOL/L (ref 1.12–1.32)
LYMPHOCYTES # BLD AUTO: 1.8 X10ˆ3/UL (ref 1–4)
LYMPHOCYTES NFR BLD AUTO: 26.8 %
MCH RBC QN AUTO: 29.4 PG (ref 26–34)
MCHC RBC AUTO-ENTMCNC: 33.2 G/DL (ref 31–37)
MCV RBC AUTO: 88.5 FL (ref 80–100)
MIXED CELL %: 11.5 %
NEUTROPHILS # BLD AUTO: 4.2 X10ˆ3/UL (ref 1.5–7.7)
NEUTROPHILS NFR BLD AUTO: 61.7 %
PLATELET # BLD AUTO: 201 X10ˆ3/UL (ref 150–450)
POTASSIUM BLD-SCNC: 4.3 MMOL/L (ref 3.6–5.1)
RBC # BLD AUTO: 4.94 X10ˆ6/UL
SODIUM BLD-SCNC: 138 MMOL/L (ref 136–145)
T4 FREE SERPL-MCNC: 0.9 NG/DL (ref 0.8–1.7)
TSI SER-ACNC: 0.3 UIU/ML (ref 0.55–4.78)
WBC # BLD AUTO: 6.8 X10ˆ3/UL (ref 4–11)

## 2024-12-10 PROCEDURE — 84439 ASSAY OF FREE THYROXINE: CPT | Performed by: PHYSICIAN ASSISTANT

## 2024-12-10 PROCEDURE — 99204 OFFICE O/P NEW MOD 45 MIN: CPT

## 2024-12-10 PROCEDURE — 36415 COLL VENOUS BLD VENIPUNCTURE: CPT

## 2024-12-10 PROCEDURE — 70491 CT SOFT TISSUE NECK W/DYE: CPT | Performed by: PHYSICIAN ASSISTANT

## 2024-12-10 PROCEDURE — 80047 BASIC METABLC PNL IONIZED CA: CPT

## 2024-12-10 PROCEDURE — 84443 ASSAY THYROID STIM HORMONE: CPT | Performed by: PHYSICIAN ASSISTANT

## 2024-12-10 PROCEDURE — 85025 COMPLETE CBC W/AUTO DIFF WBC: CPT | Performed by: PHYSICIAN ASSISTANT

## 2024-12-10 PROCEDURE — 99215 OFFICE O/P EST HI 40 MIN: CPT

## 2024-12-10 NOTE — ED PROVIDER NOTES
Patient Seen in: [facility]      History     Chief Complaint   Patient presents with    Swelling    Neck Pain     Stated Complaint: left side neck swollen    Subjective:   HPI      48yo M with known PMH: Hypertension, obstructive sleep apnea, anxiety and depression.  Patient comes in today complaining of some tenderness and swelling that he noted yesterday especially to the left side of his neck.  No fever no chills otherwise feels well.  Denies any difficulty breathing or swallowing.    Objective:     Past Medical History:    Anxiety    Cervicalgia    Depression    Essential hypertension    KERWIN (obstructive sleep apnea)    AHI 8.4 Supine AHI 5.4  Sao2 Valdemar 82.7%               Past Surgical History:   Procedure Laterality Date    Other surgical history  2004    spermatic cord excision of varicocele    Vasectomy  2010                Social History     Socioeconomic History    Marital status: Single   Tobacco Use    Smoking status: Never    Smokeless tobacco: Never   Vaping Use    Vaping status: Never Used   Substance and Sexual Activity    Alcohol use: Yes     Alcohol/week: 1.0 standard drink of alcohol     Types: 1 Standard drinks or equivalent per week     Comment: CAGe 6/4/20    Drug use: No    Sexual activity: Yes     Partners: Female   Other Topics Concern    Caffeine Concern Yes    Exercise No     Social Drivers of Health      [facility]    Social Connections    Received from [facility], [facility]    Housing Stability              Review of Systems    Positive for stated complaint: left side neck swollen  Other systems are as noted in HPI.  Constitutional and vital signs reviewed.      All other systems reviewed and negative except as noted above.    Physical Exam     ED Triage Vitals [12/10/24 0954]   BP (!) 139/96   Pulse 70   Resp 18   Temp 98.8 °F (37.1 °C)   Temp src Oral   SpO2 98  %   O2 Device None (Room air)       Current Vitals:   Vital Signs  BP: 138/82  Pulse: 82  Resp: 18  Temp: 98.8 °F (37.1 °C)  Temp src: Oral    Oxygen Therapy  SpO2: 98 %  O2 Device: None (Room air)        Physical Exam  Vitals and nursing note reviewed.   Constitutional:       General: He is not in acute distress.     Appearance: Normal appearance. He is well-developed. He is not diaphoretic.   HENT:      Head: Normocephalic and atraumatic.      Right Ear: Tympanic membrane, ear canal and external ear normal.      Left Ear: Tympanic membrane, ear canal and external ear normal.      Nose: Nose normal.      Mouth/Throat:      Mouth: Mucous membranes are moist.   Eyes:      General: No scleral icterus.     Conjunctiva/sclera: Conjunctivae normal.   Neck:      Trachea: Trachea and phonation normal.      Comments: Large amount of fullness to left neck and right side of thyroid   Cardiovascular:      Rate and Rhythm: Normal rate and regular rhythm.   Pulmonary:      Effort: Pulmonary effort is normal. No respiratory distress.      Breath sounds: Normal breath sounds.   Musculoskeletal:      Cervical back: Normal range of motion. Edema present. No spinous process tenderness or muscular tenderness.   Skin:     General: Skin is warm and dry.      Coloration: Skin is not pale.      Findings: No erythema or rash.   Neurological:      Mental Status: He is alert and oriented to person, place, and time.      Motor: No abnormal muscle tone.      Coordination: Coordination normal.      Deep Tendon Reflexes: Reflexes are normal and symmetric.   Psychiatric:         Behavior: Behavior normal.         Thought Content: Thought content normal.         Judgment: Judgment normal.                  ED Course     Labs Reviewed   TSH+FREE T4 - Abnormal; Notable for the following components:       Result Value    TSH 0.296 (*)     All other components within normal limits   POCT ISTAT CHEM8 CARTRIDGE - Abnormal; Notable for the following  components:    ISTAT BUN 25 (*)     ISTAT Glucose 110 (*)     All other components within normal limits   POCT CBC        CT SOFT TISSUE OF NECK(CONTRAST ONLY) (CPT=70491)    Result Date: 12/10/2024  PROCEDURE:  CT SOFT TISSUE OF NECK(CONTRAST ONLY) (CPT=70491)  COMPARISON:  [facility] , CT, CTA CHEST, 11/09/2012, 5:23 PM.  INDICATIONS:  left side neck swollen  TECHNIQUE:  IV contrast-enhanced multislice CT scanning is performed through the neck soft tissues during administration of nonionic contrast.  Dose reduction techniques were used. Dose information is transmitted to the ACR (American College of Radiology) NRDR (National Radiology Data Registry) which includes the Dose Index Registry.  PATIENT STATED HISTORY:(As transcribed by Technologist)  Patient states swelling to left side of face.   CONTRAST USED:  55cc of Isovue 370  FINDINGS: NASOPHARYNX:  Fossae of Rosenmuller and torus tubarius are symmetric.  ORAL CAVITY:  No visible mass.  OROPHARYNX:  Faucial and lingual tonsils are symmetric.  HYPOPHARYNX:  No mass or other visible lesion.  LARYNX:  The vocal cords are symmetric and without mass.  SINUSES:  Limited views show no significant fluid or mucosal thickening.  NECK GLANDS:  There is a large heterogeneous mass/enlargement left thyroid lobe which measures 8.4 x 4.4 cm.  This accounts for the palpable finding.  Additional evaluation with thyroid ultrasound and probably biopsy are recommended. LYMPH NODES:  No pathological-appearing or enlarged lymph nodes.  SKULL BASE:  Foramina are symmetric without bony erosion.  VASCULATURE:  Limited views are unremarkable.  BONES:  No significant osseous lesions.  OTHER:  No additional imaging findings.             CONCLUSION:  Palpable mass corresponds to a markedly enlarged and heterogeneous left thyroid lobe.  Additional evaluation with thyroid ultrasound and probably fine-needle aspiration are recommended.  This causes displacement of the trachea but no significant  luminal narrowing.   LOCATION:  [facility]    Dictated by (CST): Ruddy Reddy MD on 12/10/2024 at 11:29 AM     Finalized by (CST): Ruddy Reddy MD on 12/10/2024 at 11:31 AM           MDM            Medical Decision Making  48yo M who comes in with pain and edema to neck     Problems Addressed:  Localized swelling, mass and lump, neck: acute illness or injury  Thyroid mass: acute illness or injury    Amount and/or Complexity of Data Reviewed  External Data Reviewed: labs and notes.     Details: Matthew saw PCP and had labs 9/27 at that time TSH was mildly low and T3/T4 normal was diagnosed with subclinical hypothyroidism  and to have reevaluation with labs in 3 months.  Labs: ordered. Decision-making details documented in ED Course.     Details: TSH 0.296 which is much lower than it was in September at 0.415  Normal, CBC within normal limits, BMP nonfasting glucose of 110  Radiology: ordered and independent interpretation performed. Decision-making details documented in ED Course.     Details: CT SOFT TISSUE NECK: with heterogeneous mass located to the adjacent area of the thyroid compressing upon the trachea but with no narrowing mid  Discussion of management or test interpretation with external provider(s): Discussed with and evaluated the patient with Dr. Jane who agrees with assessment and plan.    Discussed the patient with Dr. Keating from ENT, TSH pending patient to be seen in next week In the office     Risk  OTC drugs.  Risk Details: Clinical Impression: Right mass, low TSH      The differential diagnosis before testing included thyroid mass, goiter, cancer, lymphoma, which is a medical condition that poses a threat to life/function.     Patient will need close follow-up with ENT and likely by Carolyn.  Patient will call ENT today            Disposition and Plan     Clinical Impression:  1. Localized swelling, mass and lump, neck    2. Thyroid mass         Disposition:  Discharge  12/10/2024 12:23  pm    Follow-up:  Reynold Keating MD  [address] [facility] [address]  [phone]    Call   schedule an appointment in the next week with ENT for close follow up          Medications Prescribed:  Discharge Medication List as of 12/10/2024 12:27 PM              Supplementary Documentation:

## 2024-12-10 NOTE — DISCHARGE INSTRUCTIONS
Close follow-up with ENT in the next week if you start to have any difficulty breathing or swallowing go directly to the emergency room

## 2024-12-17 ENCOUNTER — OFFICE VISIT (OUTPATIENT)
Facility: LOCATION | Age: 49
End: 2024-12-17
Payer: COMMERCIAL

## 2024-12-17 DIAGNOSIS — E04.1 THYROID NODULE: Primary | ICD-10-CM

## 2024-12-17 PROCEDURE — 99204 OFFICE O/P NEW MOD 45 MIN: CPT | Performed by: OTOLARYNGOLOGY

## 2024-12-17 NOTE — PROGRESS NOTES
Jeffrey M Fritsch is a 49 year old male.   Chief Complaint   Patient presents with    Thyroid Problem     HPI:   He has a history of a thyroid mass.  He found the mass 1 day on physical examination.  He has no pain or dysphagia associated.  He is not on thyroid medication.  He has no family history of thyroid cancer.  He has no history radiation exposure.  Current Outpatient Medications   Medication Sig Dispense Refill    PEG 3350-KCl-Na Bicarb-NaCl 420 g Oral Recon Soln Take as directed by physician. 4000 mL 0    losartan 50 MG Oral Tab Take 1 tablet (50 mg total) by mouth daily. 90 tablet 1    tadalafil 5 MG Oral Tab Take 5 mg by mouth daily. (Patient not taking: Reported on 12/10/2024)      Albuterol Sulfate HFA (PROAIR HFA) 108 (90 BASE) MCG/ACT Inhalation Aero Soln Inhale 2 puffs into the lungs every 6 (six) hours as needed for Wheezing. 1 Inhaler 1      Past Medical History:    Anxiety    Cervicalgia    Depression    Essential hypertension    KERWIN (obstructive sleep apnea)    AHI 8.4 Supine AHI 5.4  Sao2 Valdemar 82.7%       Social History:  Social History     Socioeconomic History    Marital status: Single   Tobacco Use    Smoking status: Never    Smokeless tobacco: Never   Vaping Use    Vaping status: Never Used   Substance and Sexual Activity    Alcohol use: Yes     Alcohol/week: 1.0 standard drink of alcohol     Types: 1 Standard drinks or equivalent per week     Comment: CAGe 6/4/20    Drug use: No    Sexual activity: Yes     Partners: Female   Other Topics Concern    Caffeine Concern Yes    Exercise No     Social Drivers of Health      [facility]    Social Connections    [facility] from [facility]    Housing Stability      Past Surgical History:   Procedure Laterality Date    Other surgical history  2004    spermatic cord excision of varicocele    Vasectomy  2010         REVIEW OF SYSTEMS:    GENERAL HEALTH: feels well otherwise  GENERAL : denies fever, chills, sweats, weight loss, weight gain  SKIN: denies any unusual skin lesions or rashes  RESPIRATORY: denies shortness of breath with exertion  NEURO: denies headaches    EXAM:   There were no vitals taken for this visit.    System Findings Details   Constitutional  Overall appearance - Normal.   Psychiatric  Orientation - Oriented to time, place, person & situation. Appropriate mood and affect.   Head/Face  Facial features -- Normal. Skull - Normal.   Eyes  Pupils equal ,round ,react to light and accomidate   Ears, Nose, Throat, Neck  Oral cavity clear oropharynx clear neck reveals a large mass at the left side of the neck.   Neurological  Memory - Normal. Cranial nerves - Cranial nerves II through XII grossly intact.   Lymph Detail  Submental. Submandibular. Anterior cervical. Posterior cervical. Supraclavicular.       ASSESSMENT AND PLAN:   1. Thyroid nodule  CT scan of the neck reviewed.  This shows an 8 cm probable thyroid mass extending over the midline.  This was reviewed with the patient.  Will plan for ultrasound of the mass.  He will then likely need ultrasound-guided needle biopsy.  After the above tests are in we will then discuss more surgical intervention.  - US THYROID (CPT=76536); Future      The patient indicates understanding of these issues and agrees to the plan.    No follow-ups on file.    Reynold Keating MD  12/17/2024  3:37 PM

## 2024-12-19 ENCOUNTER — HOSPITAL ENCOUNTER (OUTPATIENT)
Dept: ULTRASOUND IMAGING | Facility: HOSPITAL | Age: 49
Discharge: HOME | End: 2024-12-19
Attending: OTOLARYNGOLOGY
Payer: COMMERCIAL

## 2024-12-19 DIAGNOSIS — E04.1 THYROID NODULE: ICD-10-CM

## 2024-12-19 PROCEDURE — 76536 US EXAM OF HEAD AND NECK: CPT | Performed by: OTOLARYNGOLOGY

## 2025-01-07 NOTE — DISCHARGE INSTRUCTIONS
Discharge/After Visit Instructions  [facility] - Department of Radiology  Biopsy of the Thyroid - Biopsy of Lymph Node - Biopsy of Soft Tissue    Activity  Take it easy for the rest of the day after your biopsy. You may be sore at the site of the biopsy for the next 5 days. Do not do any strenuous exercises or lift over 5 lbs. for 24 hours after the procedure.     Biopsy Site  Keep the bandage on the biopsy site for the next hour. No shower/bathing restrictions.    Pain Management  You may use over-the-counter or prescribed pain relief medication if not contraindicated due to a medical condition.   You may use a covered ice pack to the procedure site for 20 min, as needed, for pain.   The site may be red or tender for a few days.  You may develop a sore throat after the procedure. If necessary, throat lozenges may be used to relieve the discomfort.     Medications  You may resume your usual home medications, including blood thinners (24 hours after the procedure) if you experience no bleeding or hematoma at the puncture site.     When to seek medical advice  Call the provider that ordered the biopsy with questions and to discuss test results. They may also be available on your [facility] My Chart. You may also call the Radiology nurse at [phone], M-F, 8-5 with any additional questions or concerns.    Dial [phone] and ask the  to page the on-call Radiologist right away if any of these occur:  There is a change in color or temperature of the area where the biopsy was performed.  You develop increasing pain, increased swelling in your neck, difficulty breathing or swallowing.    IF YOU FEEL YOU ARE HAVING AN EMERGENCY,   CALL 911 OR GO TO THE NEAREST EMERGENCY ROOM      4.2.24 MO/  Radiology

## 2025-01-08 ENCOUNTER — HOSPITAL ENCOUNTER (OUTPATIENT)
Dept: ULTRASOUND IMAGING | Facility: HOSPITAL | Age: 50
Discharge: HOME | End: 2025-01-08
Attending: OTOLARYNGOLOGY
Payer: COMMERCIAL

## 2025-01-08 ENCOUNTER — TELEPHONE (OUTPATIENT)
Facility: LOCATION | Age: 50
End: 2025-01-08

## 2025-01-08 DIAGNOSIS — E04.2 MULTIPLE THYROID NODULES: ICD-10-CM

## 2025-01-08 DIAGNOSIS — E04.2 MULTIPLE THYROID NODULES: Primary | ICD-10-CM

## 2025-01-08 DIAGNOSIS — E04.1 THYROID NODULE: ICD-10-CM

## 2025-01-08 PROCEDURE — 10005 FNA BX W/US GDN 1ST LES: CPT | Performed by: STUDENT IN AN ORGANIZED HEALTH CARE EDUCATION/TRAINING PROGRAM

## 2025-01-08 PROCEDURE — 10006 FNA BX W/US GDN EA ADDL: CPT | Performed by: STUDENT IN AN ORGANIZED HEALTH CARE EDUCATION/TRAINING PROGRAM

## 2025-01-08 PROCEDURE — 88173 CYTOPATH EVAL FNA REPORT: CPT | Performed by: STUDENT IN AN ORGANIZED HEALTH CARE EDUCATION/TRAINING PROGRAM

## 2025-01-31 ENCOUNTER — OFFICE VISIT (OUTPATIENT)
Facility: LOCATION | Age: 50
End: 2025-01-31
Payer: COMMERCIAL

## 2025-01-31 DIAGNOSIS — E04.1 THYROID NODULE: Primary | ICD-10-CM

## 2025-01-31 PROCEDURE — 99214 OFFICE O/P EST MOD 30 MIN: CPT | Performed by: OTOLARYNGOLOGY

## 2025-01-31 NOTE — PROGRESS NOTES
Jeffrey M Fritsch is a 49 year old male.   Chief Complaint   Patient presents with    Thyroid Problem     HPI:   He has a history of large left-sided thyroid nodule 6 cm.  He has also had subclinical hyperthyroidism and recent thyroid blood work.  He does not appear to have any symptoms associated.  He can swallow well.  He lays down to sleep well however he does use CPAP therapy.  He does have a history of sleep apnea with a respiratory disturbance index of 8.4 events per hour.  He does well on CPAP therapy.  He did have some type of procedure at [facility] in the past.    REVIEW OF SYSTEMS:   GENERAL HEALTH: feels well otherwise  GENERAL : denies fever, chills, sweats, weight loss, weight gain  SKIN: denies any unusual skin lesions or rashes  RESPIRATORY: denies shortness of breath with exertion  NEURO: denies headaches    EXAM:   There were no vitals taken for this visit.    System Findings Details   Constitutional  Overall appearance - Normal.   Psychiatric  Orientation - Oriented to time, place, person & situation. Appropriate mood and affect.   Head/Face  Facial features -- Normal. Skull - Normal.   Eyes  Pupils equal ,round ,react to light and accomidate   Ears, Nose, Throat, Neck  Oropharynx clear neck reveals a large thyroid nodule on the left.   Neurological  Memory - Normal. Cranial nerves - Cranial nerves II through XII grossly intact.   Lymph Detail  Submental. Submandibular. Anterior cervical. Posterior cervical. Supraclavicular.       ASSESSMENT AND PLAN:   1. Thyroid nodule  Patient had both left and right sided thyroid nodules biopsy.  Biopsies are consistent with benign disease.  He does have subclinical hyperthyroidism.  We have discussed surgery in detail for this large nodule which would include left thyroid lobectomy.The risk benefits and alternatives were explained to the patient.  The risks are to include but not limited to bleeding infection recurrent laryngeal nerve injury hoarseness  hypoparathyroidism to include numbness tingling cramping or cardiac arrhythmias.  The patient understands that they may need to be on thyroid medication for the rest of their life.  For now we will be conservative.  He will make a follow-up appointment with endocrinology.  There is a possibility we are dealing with a hyperfunctioning thyroid nodule.  Would be another reason to consider surgery in addition to any local symptoms.  I instructed him that  the answer will become apparent as we continue to watch this nodule and do further workup.      The patient indicates understanding of these issues and agrees to the plan.    No follow-ups on file.    Reynold Keating MD  1/31/2025  11:14 AM

## 2025-03-07 PROBLEM — D12.4 BENIGN NEOPLASM OF DESCENDING COLON: Status: ACTIVE | Noted: 2025-03-07

## 2025-04-01 ENCOUNTER — TELEPHONE (OUTPATIENT)
Facility: LOCATION | Age: 50
End: 2025-04-01

## 2025-04-01 ENCOUNTER — OFFICE VISIT (OUTPATIENT)
Facility: LOCATION | Age: 50
End: 2025-04-01
Payer: COMMERCIAL

## 2025-04-01 VITALS
HEART RATE: 71 BPM | DIASTOLIC BLOOD PRESSURE: 85 MMHG | OXYGEN SATURATION: 97 % | SYSTOLIC BLOOD PRESSURE: 152 MMHG | TEMPERATURE: 99 F

## 2025-04-01 DIAGNOSIS — D12.6 SESSILE SERRATED POLYP OF COLON: ICD-10-CM

## 2025-04-01 DIAGNOSIS — K40.90 NON-RECURRENT UNILATERAL INGUINAL HERNIA WITHOUT OBSTRUCTION OR GANGRENE: Primary | ICD-10-CM

## 2025-04-01 DIAGNOSIS — K38.8 HYPERPLASTIC POLYP OF APPENDIX: ICD-10-CM

## 2025-04-01 PROCEDURE — 99203 OFFICE O/P NEW LOW 30 MIN: CPT | Performed by: STUDENT IN AN ORGANIZED HEALTH CARE EDUCATION/TRAINING PROGRAM

## 2025-04-01 PROCEDURE — 3077F SYST BP >= 140 MM HG: CPT | Performed by: STUDENT IN AN ORGANIZED HEALTH CARE EDUCATION/TRAINING PROGRAM

## 2025-04-01 PROCEDURE — 3079F DIAST BP 80-89 MM HG: CPT | Performed by: STUDENT IN AN ORGANIZED HEALTH CARE EDUCATION/TRAINING PROGRAM

## 2025-04-01 NOTE — H&P
Patient ID: Jeffrey M Fritsch is a 49 year old male.    Chief Complaint   Patient presents with    New Patient     NP - appendectomy consult, pt reports no symptoms.       HPI: Jeffrey M Fritsch is a 49 year old male presents for evaluation.  Patient recently underwent colonoscopy with Dr. Suárez.  Patient was found to have a polyp right at the appendiceal orifice.  Polyp was incompletely removed and pathology revealed a sessile serrated polyp.  Patient presents for appendectomy.  Patient denies any symptoms.  He denies any nausea, vomiting, or changes bowel habits.  He does report right hip and groin pain for the past few years.  He has undergone multiple workups and has been in physical therapy without improvement.  He is concerned about a potential hernia.    Workup:   Pathology  Final Diagnosis:   A.Appendiceal orifice:   -Portions of sessile serrated polyp.   -No evidence of dysplasia or malignancy.     B.  Descending colon polyp:   -Hyperplastic polyp.   -Negative for dysplasia or malignancy.       Past Medical History  Past Medical History:    Anxiety    Back pain    Cervicalgia    Depression    Essential hypertension    Feeling lonely    KERWIN (obstructive sleep apnea)    AHI 8.4 Supine AHI 5.4  Sao2 Valdemar 82.7%     Pain in joints    Hips    Stress    Wears glasses       Past Surgical History  Past Surgical History:   Procedure Laterality Date    Other surgical history  2004    spermatic cord excision of varicocele    Vasectomy  2010       Medications  Current Outpatient Medications   Medication Sig Dispense Refill    losartan 50 MG Oral Tab Take 1 tablet (50 mg total) by mouth daily. 90 tablet 1    Albuterol Sulfate HFA (PROAIR HFA) 108 (90 BASE) MCG/ACT Inhalation Aero Soln Inhale 2 puffs into the lungs every 6 (six) hours as needed for Wheezing. 1 Inhaler 1    tadalafil 5 MG Oral Tab Take 5 mg by mouth daily. (Patient not taking: Reported on 4/1/2025)         Allergies  Allergies[1]    Social  History  History   Smoking Status    Never   Smokeless Tobacco    Never     History   Alcohol Use    1.0 standard drink of alcohol/week    1 Standard drinks or equivalent per week     Comment: CAGe 6/4/20     History   Drug Use Unknown       Family History  Family History   Problem Relation Age of Onset    Heart Attack Maternal Grandfather     Asthma Brother     Other (CHF) Maternal Grandmother     High Cholesterol Mother     Hypertension Mother     Hypertension Brother        Review of Systems  Review of Systems   Constitutional: Negative.    Respiratory: Negative.     Cardiovascular: Negative.    Gastrointestinal:  Negative for abdominal distention, abdominal pain, constipation, nausea and vomiting.       Exam  Vitals:    04/01/25 1215   BP: 152/85   Pulse: 71   Temp: 98.7 °F (37.1 °C)     Physical Exam  Constitutional:       Appearance: Normal appearance.   Cardiovascular:      Rate and Rhythm: Normal rate.   Pulmonary:      Effort: Pulmonary effort is normal.   Abdominal:      General: There is no distension.      Palpations: Abdomen is soft.      Tenderness: There is no abdominal tenderness.      Hernia: A hernia is present.       Musculoskeletal:         General: Normal range of motion.   Skin:     General: Skin is warm and dry.   Neurological:      Mental Status: He is alert and oriented to person, place, and time.           Assessment/Plan  Assessment   Problem List Items Addressed This Visit          Gastrointestinal and Abdominal    Non-recurrent unilateral inguinal hernia without obstruction or gangrene - Primary    Relevant Orders    US GROIN BILATERAL (CPT=76882-50)    Dr. Wilkins's Surgical Case Request (do not use for updates/changes within 48 hrs of procedure)    Hyperplastic polyp of appendix    Relevant Orders    Dr. Wilkins's Surgical Case Request (do not use for updates/changes within 48 hrs of procedure)    Sessile serrated polyp of colon       Jeffrey M Fritsch is a 49 year old male with  sessile serrated polyp of the appendiceal orifice, right inguinal hernia    On physical exam, patient has a small bulge that is reducible in the right groin.  He also has a bulge near his femoral vessels which is tender and at the site of patient's tenderness  Will obtain ultrasound of the groins bilaterally to rule out bilateral hernias as well as a femoral hernia in the right groin    In terms of patient's polyp, the polyp at the appendiceal orifice was incompletely removed and the pathology is consistent with a precancerous lesion  This will need to be removed surgically  I recommend proceeding with appendectomy  Patient also has an umbilical hernia that we will repair at the time of surgery  We will plan for laparoscopic appendectomy with umbilical hernia repair  Procedure explained to the patient  Risk include bleeding, pain, infection, injury to anything in the abdomen, and need for further intervention    Will plan for telehealth once ultrasound has been completed    Patient can call the office for any questions or concerns      Sena Wilkins MD  General Surgery  [facility]     CC:  Tim Paul MD       [1] No Known Allergies     Yes - the patient is able to be screened

## 2025-04-02 PROBLEM — D12.6 SESSILE SERRATED POLYP OF COLON: Status: ACTIVE | Noted: 2025-04-02

## 2025-04-02 PROBLEM — K38.8 HYPERPLASTIC POLYP OF APPENDIX: Status: ACTIVE | Noted: 2025-04-02

## 2025-04-02 PROBLEM — K40.90 NON-RECURRENT UNILATERAL INGUINAL HERNIA WITHOUT OBSTRUCTION OR GANGRENE: Status: ACTIVE | Noted: 2025-04-02

## 2025-04-16 ENCOUNTER — TELEPHONE (OUTPATIENT)
Facility: LOCATION | Age: 50
End: 2025-04-16

## 2025-04-16 DIAGNOSIS — K38.8 HYPERPLASTIC POLYP OF APPENDIX: ICD-10-CM

## 2025-04-16 DIAGNOSIS — K40.90 NON-RECURRENT UNILATERAL INGUINAL HERNIA WITHOUT OBSTRUCTION OR GANGRENE: Primary | ICD-10-CM

## 2025-04-23 ENCOUNTER — HOSPITAL ENCOUNTER (OUTPATIENT)
Dept: ULTRASOUND IMAGING | Age: 50
Discharge: HOME | End: 2025-04-23
Attending: STUDENT IN AN ORGANIZED HEALTH CARE EDUCATION/TRAINING PROGRAM
Payer: COMMERCIAL

## 2025-04-23 ENCOUNTER — EKG ENCOUNTER (OUTPATIENT)
Dept: LAB | Age: 50
End: 2025-04-23
Attending: STUDENT IN AN ORGANIZED HEALTH CARE EDUCATION/TRAINING PROGRAM
Payer: COMMERCIAL

## 2025-04-23 DIAGNOSIS — K40.90 NON-RECURRENT UNILATERAL INGUINAL HERNIA WITHOUT OBSTRUCTION OR GANGRENE: ICD-10-CM

## 2025-04-23 DIAGNOSIS — K40.90 INGUINAL HERNIA: ICD-10-CM

## 2025-04-23 DIAGNOSIS — Z01.818 PRE-OP TESTING: ICD-10-CM

## 2025-04-23 LAB
ANION GAP SERPL CALC-SCNC: 9 MMOL/L (ref 0–18)
BUN BLD-MCNC: 24 MG/DL (ref 9–23)
CALCIUM BLD-MCNC: 10.7 MG/DL (ref 8.7–10.6)
CHLORIDE SERPL-SCNC: 106 MMOL/L (ref 98–112)
CO2 SERPL-SCNC: 26 MMOL/L (ref 21–32)
CREAT BLD-MCNC: 1.17 MG/DL (ref 0.7–1.3)
EGFRCR SERPLBLD CKD-EPI 2021: 75 ML/MIN/1.73M2 (ref 60–?)
FASTING STATUS PATIENT QL REPORTED: YES
GLUCOSE BLD-MCNC: 87 MG/DL (ref 70–99)
OSMOLALITY SERPL CALC.SUM OF ELEC: 295 MOSM/KG (ref 275–295)
POTASSIUM SERPL-SCNC: 4.2 MMOL/L (ref 3.5–5.1)
SODIUM SERPL-SCNC: 141 MMOL/L (ref 136–145)

## 2025-04-23 PROCEDURE — 76882 US LMTD JT/FCL EVL NVASC XTR: CPT | Performed by: STUDENT IN AN ORGANIZED HEALTH CARE EDUCATION/TRAINING PROGRAM

## 2025-04-23 PROCEDURE — 80048 BASIC METABOLIC PNL TOTAL CA: CPT

## 2025-04-23 PROCEDURE — 93005 ELECTROCARDIOGRAM TRACING: CPT

## 2025-04-23 PROCEDURE — 36415 COLL VENOUS BLD VENIPUNCTURE: CPT

## 2025-04-23 PROCEDURE — 93010 ELECTROCARDIOGRAM REPORT: CPT | Performed by: INTERNAL MEDICINE

## 2025-04-25 ENCOUNTER — TELEPHONE (OUTPATIENT)
Facility: LOCATION | Age: 50
End: 2025-04-25

## 2025-04-25 LAB
ATRIAL RATE: 62 BPM
P-R INTERVAL: 174 MS
Q-T INTERVAL: 412 MS
QRS DURATION: 82 MS
QTC CALCULATION (BEZET): 418 MS
R AXIS: 24 DEGREES
T AXIS: 19 DEGREES
VENTRICULAR RATE: 62 BPM

## 2025-04-30 ENCOUNTER — ANESTHESIA EVENT (OUTPATIENT)
Dept: SURGERY | Facility: HOSPITAL | Age: 50
End: 2025-04-30
Payer: COMMERCIAL

## 2025-05-01 ENCOUNTER — HOSPITAL ENCOUNTER (OUTPATIENT)
Facility: HOSPITAL | Age: 50
Setting detail: HOSPITAL OUTPATIENT SURGERY
Discharge: HOME | End: 2025-05-01
Attending: STUDENT IN AN ORGANIZED HEALTH CARE EDUCATION/TRAINING PROGRAM | Admitting: STUDENT IN AN ORGANIZED HEALTH CARE EDUCATION/TRAINING PROGRAM
Payer: COMMERCIAL

## 2025-05-01 ENCOUNTER — ANESTHESIA (OUTPATIENT)
Dept: SURGERY | Facility: HOSPITAL | Age: 50
End: 2025-05-01
Payer: COMMERCIAL

## 2025-05-01 VITALS
HEART RATE: 71 BPM | RESPIRATION RATE: 16 BRPM | HEIGHT: 67 IN | DIASTOLIC BLOOD PRESSURE: 109 MMHG | TEMPERATURE: 97 F | SYSTOLIC BLOOD PRESSURE: 151 MMHG | OXYGEN SATURATION: 97 % | WEIGHT: 190 LBS | BODY MASS INDEX: 29.82 KG/M2

## 2025-05-01 DIAGNOSIS — K40.90 NON-RECURRENT UNILATERAL INGUINAL HERNIA WITHOUT OBSTRUCTION OR GANGRENE: ICD-10-CM

## 2025-05-01 DIAGNOSIS — D12.6 SESSILE SERRATED POLYP OF COLON: ICD-10-CM

## 2025-05-01 DIAGNOSIS — D12.1 ADENOMATOUS POLYP OF APPENDIX: Primary | ICD-10-CM

## 2025-05-01 DIAGNOSIS — Z01.818 PRE-OP TESTING: ICD-10-CM

## 2025-05-01 DIAGNOSIS — K40.90 INGUINAL HERNIA: ICD-10-CM

## 2025-05-01 DIAGNOSIS — K38.8 HYPERPLASTIC POLYP OF APPENDIX: ICD-10-CM

## 2025-05-01 PROCEDURE — 44970 LAPAROSCOPY APPENDECTOMY: CPT | Performed by: STUDENT IN AN ORGANIZED HEALTH CARE EDUCATION/TRAINING PROGRAM

## 2025-05-01 PROCEDURE — 44970 LAPAROSCOPY APPENDECTOMY: CPT

## 2025-05-01 RX ORDER — HYDROMORPHONE HYDROCHLORIDE 1 MG/ML
0.2 INJECTION, SOLUTION INTRAMUSCULAR; INTRAVENOUS; SUBCUTANEOUS EVERY 5 MIN PRN
Status: DISCONTINUED | OUTPATIENT
Start: 2025-05-01 | End: 2025-05-01

## 2025-05-01 RX ORDER — LIDOCAINE HYDROCHLORIDE 10 MG/ML
INJECTION, SOLUTION EPIDURAL; INFILTRATION; INTRACAUDAL; PERINEURAL AS NEEDED
Status: DISCONTINUED | OUTPATIENT
Start: 2025-05-01 | End: 2025-05-01 | Stop reason: SURG

## 2025-05-01 RX ORDER — BUPIVACAINE HYDROCHLORIDE 2.5 MG/ML
INJECTION, SOLUTION EPIDURAL; INFILTRATION; INTRACAUDAL; PERINEURAL AS NEEDED
Status: DISCONTINUED | OUTPATIENT
Start: 2025-05-01 | End: 2025-05-01 | Stop reason: HOSPADM

## 2025-05-01 RX ORDER — ACETAMINOPHEN 500 MG
1000 TABLET ORAL ONCE
Status: DISCONTINUED | OUTPATIENT
Start: 2025-05-01 | End: 2025-05-01 | Stop reason: HOSPADM

## 2025-05-01 RX ORDER — DEXAMETHASONE SODIUM PHOSPHATE 4 MG/ML
VIAL (ML) INJECTION AS NEEDED
Status: DISCONTINUED | OUTPATIENT
Start: 2025-05-01 | End: 2025-05-01 | Stop reason: SURG

## 2025-05-01 RX ORDER — HYDROCODONE BITARTRATE AND ACETAMINOPHEN 5; 325 MG/1; MG/1
2 TABLET ORAL ONCE AS NEEDED
Status: COMPLETED | OUTPATIENT
Start: 2025-05-01 | End: 2025-05-01

## 2025-05-01 RX ORDER — ONDANSETRON 2 MG/ML
4 INJECTION INTRAMUSCULAR; INTRAVENOUS EVERY 6 HOURS PRN
Status: DISCONTINUED | OUTPATIENT
Start: 2025-05-01 | End: 2025-05-01

## 2025-05-01 RX ORDER — LABETALOL HYDROCHLORIDE 5 MG/ML
5 INJECTION, SOLUTION INTRAVENOUS EVERY 5 MIN PRN
Status: DISCONTINUED | OUTPATIENT
Start: 2025-05-01 | End: 2025-05-01

## 2025-05-01 RX ORDER — POLYETHYLENE GLYCOL 3350 17 G/17G
17 POWDER, FOR SOLUTION ORAL DAILY PRN
Qty: 30 EACH | Refills: 0 | Status: SHIPPED | OUTPATIENT
Start: 2025-05-01

## 2025-05-01 RX ORDER — METRONIDAZOLE 500 MG/100ML
500 INJECTION, SOLUTION INTRAVENOUS ONCE
Status: COMPLETED | OUTPATIENT
Start: 2025-05-01 | End: 2025-05-01

## 2025-05-01 RX ORDER — OXYCODONE HYDROCHLORIDE 5 MG/1
2.5 TABLET ORAL EVERY 6 HOURS PRN
Qty: 10 TABLET | Refills: 0 | Status: SHIPPED | OUTPATIENT
Start: 2025-05-01

## 2025-05-01 RX ORDER — SODIUM CHLORIDE, SODIUM LACTATE, POTASSIUM CHLORIDE, CALCIUM CHLORIDE 600; 310; 30; 20 MG/100ML; MG/100ML; MG/100ML; MG/100ML
INJECTION, SOLUTION INTRAVENOUS CONTINUOUS
Status: DISCONTINUED | OUTPATIENT
Start: 2025-05-01 | End: 2025-05-01

## 2025-05-01 RX ORDER — ONDANSETRON 2 MG/ML
INJECTION INTRAMUSCULAR; INTRAVENOUS AS NEEDED
Status: DISCONTINUED | OUTPATIENT
Start: 2025-05-01 | End: 2025-05-01 | Stop reason: SURG

## 2025-05-01 RX ORDER — HYDROMORPHONE HYDROCHLORIDE 1 MG/ML
0.6 INJECTION, SOLUTION INTRAMUSCULAR; INTRAVENOUS; SUBCUTANEOUS EVERY 5 MIN PRN
Status: DISCONTINUED | OUTPATIENT
Start: 2025-05-01 | End: 2025-05-01

## 2025-05-01 RX ORDER — HYDROCODONE BITARTRATE AND ACETAMINOPHEN 5; 325 MG/1; MG/1
1 TABLET ORAL ONCE AS NEEDED
Status: COMPLETED | OUTPATIENT
Start: 2025-05-01 | End: 2025-05-01

## 2025-05-01 RX ORDER — ACETAMINOPHEN 500 MG
1000 TABLET ORAL ONCE AS NEEDED
Status: COMPLETED | OUTPATIENT
Start: 2025-05-01 | End: 2025-05-01

## 2025-05-01 RX ORDER — ONDANSETRON 4 MG/1
4 TABLET, ORALLY DISINTEGRATING ORAL EVERY 8 HOURS PRN
Qty: 10 TABLET | Refills: 0 | Status: SHIPPED | OUTPATIENT
Start: 2025-05-01

## 2025-05-01 RX ORDER — HYDROMORPHONE HYDROCHLORIDE 1 MG/ML
0.4 INJECTION, SOLUTION INTRAMUSCULAR; INTRAVENOUS; SUBCUTANEOUS EVERY 5 MIN PRN
Status: DISCONTINUED | OUTPATIENT
Start: 2025-05-01 | End: 2025-05-01

## 2025-05-01 RX ORDER — PROCHLORPERAZINE EDISYLATE 5 MG/ML
5 INJECTION INTRAMUSCULAR; INTRAVENOUS EVERY 8 HOURS PRN
Status: DISCONTINUED | OUTPATIENT
Start: 2025-05-01 | End: 2025-05-01

## 2025-05-01 RX ORDER — NALOXONE HYDROCHLORIDE 0.4 MG/ML
0.08 INJECTION, SOLUTION INTRAMUSCULAR; INTRAVENOUS; SUBCUTANEOUS AS NEEDED
Status: DISCONTINUED | OUTPATIENT
Start: 2025-05-01 | End: 2025-05-01

## 2025-05-01 RX ORDER — SCOPOLAMINE 1 MG/3D
1 PATCH, EXTENDED RELEASE TRANSDERMAL ONCE
Status: DISCONTINUED | OUTPATIENT
Start: 2025-05-01 | End: 2025-05-01 | Stop reason: HOSPADM

## 2025-05-01 RX ORDER — ROCURONIUM BROMIDE 10 MG/ML
INJECTION, SOLUTION INTRAVENOUS AS NEEDED
Status: DISCONTINUED | OUTPATIENT
Start: 2025-05-01 | End: 2025-05-01 | Stop reason: SURG

## 2025-05-01 RX ADMIN — DEXAMETHASONE SODIUM PHOSPHATE 4 MG: 4 MG/ML VIAL (ML) INJECTION at 11:07:00

## 2025-05-01 RX ADMIN — ONDANSETRON 4 MG: 2 INJECTION INTRAMUSCULAR; INTRAVENOUS at 11:59:00

## 2025-05-01 RX ADMIN — LIDOCAINE HYDROCHLORIDE 5 ML: 10 INJECTION, SOLUTION EPIDURAL; INFILTRATION; INTRACAUDAL; PERINEURAL at 11:03:00

## 2025-05-01 RX ADMIN — SODIUM CHLORIDE, SODIUM LACTATE, POTASSIUM CHLORIDE, CALCIUM CHLORIDE: 600; 310; 30; 20 INJECTION, SOLUTION INTRAVENOUS at 11:11:00

## 2025-05-01 RX ADMIN — METRONIDAZOLE 500 MG: 500 INJECTION, SOLUTION INTRAVENOUS at 11:06:00

## 2025-05-01 RX ADMIN — ROCURONIUM BROMIDE 50 MG: 10 INJECTION, SOLUTION INTRAVENOUS at 11:06:00

## 2025-05-01 RX ADMIN — SODIUM CHLORIDE, SODIUM LACTATE, POTASSIUM CHLORIDE, CALCIUM CHLORIDE: 600; 310; 30; 20 INJECTION, SOLUTION INTRAVENOUS at 10:58:00

## 2025-05-01 NOTE — DISCHARGE INSTRUCTIONS
Appendectomy  Dr. Sena Wilkins    MEDICATIONS  For post-operative pain control, the medications are usually oxycodone.  This is a narcotic and is best taken by starting with a half a tablet every four to six hours as needed.  If the patient does not feel they need the narcotics they shouldn’t take them.  If the pain is severe the patient may take a whole pill every six hours.  The patient can take tylenol 1g three times a day and ibuprofen 400-600mg in between up to 4 times a day as needed for pain as well.  The patient can take zofran 4mg every 6 hours as needed for nausea. The patient can also take miralax or colace as needed for constipation. Please ask your surgeon before resuming blood thinners such as aspirin, Plavix or Coumadin.  All other home medications may be resumed as scheduled.  With severe appendicitis, antibiotics will also be prescribed.  With antibiotics, please watch for rash, facial swelling or severe diarrhea.    DIET  The patient may resume a general diet immediately.  This is not a good time to eat excessively.  The patient should eat in moderation and stick with foods the patient feels are easy to digest. There should be no alcohol consumption in the immediate recovery time period or within six hours of taking narcotics.    WOUND CARE  The dressing is usually a dissolvable glue which protects the wound. The patient can take a shower starting the day after surgery, but please, no baths or soaking. Please do not put any creams or ointments on the surgical incisions. If the patient does have a top dressing with clear tape and gauze, this dressing may be removed in 2 days. Do not remove the steri-strips or butterfly tapes that are white and adherent to the skin.  The steri-strips will eventually peel up at the ends and at this point they may be removed.  This is usually seven to ten days after surgery.  When showering, soap can get on the wounds but do not scrub over the wounds.  No hair dye  or chemicals of any kind should get in the wounds.  Avoid tub baths, swimming or sitting in a hot tub for two weeks.  If visible sutures or staples are present they will be removed in the office by the surgeon or nurse.  Most wounds will be closed with dissolving suture underneath the skin.  These sutures will dissolve on their own.  If a drain is present make sure the patient receives drain care instructions from the nurse prior to discharge.  Most patients will not have a drain.    ACTIVITY  Every day the patient should be up, showered and dressed.  Each day the patient should be up and around the house.  The patient should not lie in bed and should not stay in pajamas.  We count on the patient being up, coughing, walking and deep breathing to avoid pneumonia and blood clots in the legs.  Once a day the patient should get out of the house and go shopping, go to the mall, the [employer] store, the [employer] or a restaurant.  The patient may ride in a car but should not drive the car for at least one week.  Patients should be off narcotics for at least 8 hours prior to being a .  The average time off work is 10 to 14 days; most adults will be seeing the surgeon prior to returning to work.  Students will return to school within 1-5 days after discharge from the hospital but will be off gym, sports, and indoors for recess for one month.  Patients may go up and down stairs and lift up to five pounds but no bending, pushing or pulling.  Nothing called work or exercise until the follow up visit.  No ‘stair-master’, power walking, jogging or workouts until the follow up visit.  Patients should seek further activity limits at the time of their appointment.    APPOINTMENT  Please call our office for an appointment within 2 weeks of discharge.  Any fever greater than 100.5, chills, nausea, vomiting, or severe diarrhea please call our office.  If the wound turns red, hot, swollen, becomes increasingly painful, or drains pus  call us immediately at [phone].  For life threatening emergencies call 911.  For non-emergent care please call our office after 8:30 a.m. Monday through Friday.  The number listed above is our office number; our phone automatically switches to our answering service if we are not there.  Please do not use the emergency room for non-urgent care.    Thank you for entrusting us with your care.  [facility]--General Surgery    [place] was given to you at: 1:40 pm  Next dose oxycodone: 7:40 pm  Take this medication as directed

## 2025-05-01 NOTE — ANESTHESIA POSTPROCEDURE EVALUATION
[facility]    Jeffrey M Fritsch Patient Status:  Hospital Outpatient Surgery   Age/Gender 49 year old male MRN [id number]   Location [facility] POST ANESTHESIA CARE UNIT Attending Sena Wilkins MD   Hosp Day # 0 PCP Tim Paul MD       Anesthesia Post-op Note    LAPAROSCOPIC  APPENDECTOMY, UMBILICAL HERNIA REPAIR    Procedure Summary       Date: 05/01/25 Room / Location:  MAIN OR  /  MAIN OR    Anesthesia Start: 1058 Anesthesia Stop: 1209    Procedure: LAPAROSCOPIC  APPENDECTOMY, UMBILICAL HERNIA REPAIR (Abdomen) Diagnosis:       Non-recurrent unilateral inguinal hernia without obstruction or gangrene      Hyperplastic polyp of appendix      (Non-recurrent unilateral inguinal hernia without obstruction or gangrene [K40.90]Hyperplastic polyp of appendix [K38.8])    Surgeons: Sena Wilkins MD Anesthesiologist: Victor M Valdez MD    Anesthesia Type: general ASA Status: 2            Anesthesia Type: general    Vitals Value Taken Time   /87 05/01/25 12:20   Temp 97.6 °F (36.4 °C) 05/01/25 12:08   Pulse 87 05/01/25 12:31   Resp 15 05/01/25 12:31   SpO2 97 % 05/01/25 12:31   Vitals shown include unfiled device data.        Patient Location: PACU    Anesthesia Type: general    Airway Patency: patent    Postop Pain Control: adequate    Mental Status: preanesthetic baseline    Nausea/Vomiting: none    Cardiopulmonary/Hydration status: stable euvolemic    Complications: no apparent anesthesia related complications    Postop vital signs: stable    Dental Exam: Unchanged from Preop    Patient to be discharged from PACU when criteria met.

## 2025-05-01 NOTE — OPERATIVE REPORT
OPERATIVE NOTE    Jeffrey M Fritsch Location: OR   [facility] [id number] MRN [id number]   Admission Date 5/1/2025 Operation Date 5/1/2025   Attending Physician Sena Wilkins MD Operating Physician Sena Wilkins MD     PREOPERATIVE DIAGNOSIS  Sessile serrated polyp of appendiceal orifice  Umbilical hernia    POSTOPERATIVE DIAGNOSIS  Sessile serrated polyp of appendiceal orifice  Umbilical hernia    PROCEDURE PERFORMED  Laparoscopic appendectomy  Transversus abdominis plane block  Primary umbilical hernia repair    SURGEON  Sena Wilkins MD    ASSISTANTS  HAROON Reed her assistance was necessary for the conduct of this case especially in the intra-abdominal dissection and hernia repair    ANESTHESIA  General    FINDINGS   Normal appendix, extra margin of cecum taken to ensure complete resection of polyp, 2 cm umbilical hernia defect    INDICATIONS  This is a 49-year-old gentleman who recently underwent colonoscopy with Dr. Suárez.  On colonoscopy, patient was found to have a sessile serrated polyp at the appendiceal orifice that was incompletely removed.  Due to the precancerous nature of the polyp, definitive resection was indicated by appendectomy.  The procedure and all the risks were discussed with the patient and he wishes to proceed.    DESCRIPTION OF PROCEDURE  After informed consent, patient was brought to the operating room and placed in supine position. Bilateral SCDs were placed and pre-operative antibiotics administered. Anesthesia was induced without any complication.  Payne catheter was placed.  Patient was prepped and draped in the usual sterile fashion. Time out was performed confirming patient, procedure, and site.    The Veress needle was used to gain pneumoperitoneum. Using blade, a curvilinear supraumbilical incision was made.  Upon palpation, patient had small umbilical hernia.  Using hemostats, umbilical stalk was circumferentially dissected to isolate the umbilical stalk.  Once the stalk  was isolated, the hernia was resected off of the umbilicus.  The hernia sac was resected at the level of the fascia and sent to pathology as umbilical hernia sac and contents.  The hernia was entered and contained preperitoneal fat.  The defect was approximately 2 centimeter.  Fascia surrounding the defect was exposed for later repair. Veress needle was inserted into the hernia defect with audible clicks. On aspiration, there was no bowel contents.  Saline flowed easily confirming presence in the abdomen. Pneumoperitoneum was created with CO2. An 12mm port was inserted. Upon entrance, no injury was noted to omentum or bowel at site of entrance.  Two 5mm ports were placed under direct visualization; the first left lower quadrant and the second in the suprapubic, triangulating toward the location of the appendix. Transversus abdominis plane block was performed.     Using graspers, bowel was retracted and the appendix identified.  Appendix was normal-appearing. The mesoappendix was identified and divided with the Ligasure.  The base of the appendix was also identified and any attachments to the retroperitoneum were removed to allow for a stapler across the base, taking a small portion of the cecum with it.  Using the Endo SHANIA stapler with a tan load, the appendix was divided.  Due to the nature of patient's diagnosis, an extra margin of the cecum was taken to ensure complete resection of the polyp. The staple line as well as the mesoappendix was hemostatic after.  Abdomen was irrigated. Appendix was placed in endocatch bag and removed from abdomen for pathology. Pneumoperitoneum was evacuated. Fascia at umbilicus was closed with o Ethibond suture. The umbilicus was tacked down to the underlying fascia with a 2-0 Vicryl.  Skin at the umbilicus was reapproximated with 3-0 Vicryl deep dermals. Skin incisions were closed with 4-o monocryl. Incisions were washed and dressed with dermabond.     At the end of the case, all  counts were correct. Patient tolerated procedure well. Patient was awakened and transferred to PACU in a hemodynamically stable condition.    SPECIMENS REMOVED  Appendix  Umbilical hernia sac and contents    ESTIMATED BLOOD LOSS  5 ml    COMPLICATIONS  None     Sena Wilkins MD

## 2025-05-01 NOTE — H&P
The above referenced H&P was reviewed by Sena Wilkins MD on 5/1/2025, the patient was examined and no significant changes have occurred in the patient's condition since the H&P was performed.  Risks, benefits, alternative treatments and consequences of no treatment were discussed.  We will proceed with procedure as planned.    No new issues since last appointment.  All questions answered.  Will proceed as scheduled.    Sena Wilkins MD  5/1/2025  10:23 AM        Patient ID: Jeffrey M Fritsch is a 49 year old male.    Chief Complaint   Patient presents with    New Patient     NP - appendectomy consult, pt reports no symptoms.       HPI: Jeffrey M Fritsch is a 49 year old male presents for evaluation.  Patient recently underwent colonoscopy with Dr. Suárez.  Patient was found to have a polyp right at the appendiceal orifice.  Polyp was incompletely removed and pathology revealed a sessile serrated polyp.  Patient presents for appendectomy.  Patient denies any symptoms.  He denies any nausea, vomiting, or changes bowel habits.  He does report right hip and groin pain for the past few years.  He has undergone multiple workups and has been in physical therapy without improvement.  He is concerned about a potential hernia.    Workup:   Pathology  Final Diagnosis:   A.Appendiceal orifice:   -Portions of sessile serrated polyp.   -No evidence of dysplasia or malignancy.     B.  Descending colon polyp:   -Hyperplastic polyp.   -Negative for dysplasia or malignancy.       Past Medical History  Past Medical History:    Anxiety    Back pain    Cervicalgia    Depression    Essential hypertension    Feeling lonely    KERWIN (obstructive sleep apnea)    AHI 8.4 Supine AHI 5.4  Sao2 Valdemar 82.7%     Pain in joints    Hips    Stress    Wears glasses       Past Surgical History  Past Surgical History:   Procedure Laterality Date    Other surgical history  2004    spermatic cord excision of varicocele    Vasectomy  2010        Medications  Current Outpatient Medications   Medication Sig Dispense Refill    losartan 50 MG Oral Tab Take 1 tablet (50 mg total) by mouth daily. 90 tablet 1    Albuterol Sulfate HFA (PROAIR HFA) 108 (90 BASE) MCG/ACT Inhalation Aero Soln Inhale 2 puffs into the lungs every 6 (six) hours as needed for Wheezing. 1 Inhaler 1    tadalafil 5 MG Oral Tab Take 5 mg by mouth daily. (Patient not taking: Reported on 4/1/2025)         Allergies  [Allergies]    [Allergies]  No Known Allergies    Social History  History   Smoking Status    Never   Smokeless Tobacco    Never     History   Alcohol Use    1.0 standard drink of alcohol/week    1 Standard drinks or equivalent per week     Comment: CAGe 6/4/20     History   Drug Use Unknown       Family History  Family History   Problem Relation Age of Onset    Heart Attack Maternal Grandfather     Asthma Brother     Other (CHF) Maternal Grandmother     High Cholesterol Mother     Hypertension Mother     Hypertension Brother        Review of Systems  Review of Systems   Constitutional: Negative.    Respiratory: Negative.     Cardiovascular: Negative.    Gastrointestinal:  Negative for abdominal distention, abdominal pain, constipation, nausea and vomiting.       Exam  Vitals:    04/01/25 1215   BP: 152/85   Pulse: 71   Temp: 98.7 °F (37.1 °C)     Physical Exam  Constitutional:       Appearance: Normal appearance.   Cardiovascular:      Rate and Rhythm: Normal rate.   Pulmonary:      Effort: Pulmonary effort is normal.   Abdominal:      General: There is no distension.      Palpations: Abdomen is soft.      Tenderness: There is no abdominal tenderness.      Hernia: A hernia is present.       Musculoskeletal:         General: Normal range of motion.   Skin:     General: Skin is warm and dry.   Neurological:      Mental Status: He is alert and oriented to person, place, and time.           Assessment/Plan  Assessment   Problem List Items Addressed This Visit           Gastrointestinal and Abdominal    Non-recurrent unilateral inguinal hernia without obstruction or gangrene - Primary    Relevant Orders    US GROIN BILATERAL (CPT=76882-50)    Dr. Wilkins's Surgical Case Request (do not use for updates/changes within 48 hrs of procedure)    Hyperplastic polyp of appendix    Relevant Orders    Dr. Wilkins's Surgical Case Request (do not use for updates/changes within 48 hrs of procedure)    Sessile serrated polyp of colon       Jeffrey M Fritsch is a 49 year old male with sessile serrated polyp of the appendiceal orifice, right inguinal hernia    On physical exam, patient has a small bulge that is reducible in the right groin.  He also has a bulge near his femoral vessels which is tender and at the site of patient's tenderness  Will obtain ultrasound of the groins bilaterally to rule out bilateral hernias as well as a femoral hernia in the right groin    In terms of patient's polyp, the polyp at the appendiceal orifice was incompletely removed and the pathology is consistent with a precancerous lesion  This will need to be removed surgically  I recommend proceeding with appendectomy  Patient also has an umbilical hernia that we will repair at the time of surgery  We will plan for laparoscopic appendectomy with umbilical hernia repair  Procedure explained to the patient  Risk include bleeding, pain, infection, injury to anything in the abdomen, and need for further intervention    Will plan for telehealth once ultrasound has been completed    Patient can call the office for any questions or concerns      Sena Wilkins MD  General Surgery  [facility]     CC:  Tim Paul MD

## 2025-05-01 NOTE — ANESTHESIA PREPROCEDURE EVALUATION
PRE-OP EVALUATION    Patient Name: Jeffrey M Fritsch    Admit Diagnosis: Non-recurrent unilateral inguinal hernia without obstruction or gangrene [K40.90]  Hyperplastic polyp of appendix [K38.8]    Pre-op Diagnosis: Non-recurrent unilateral inguinal hernia without obstruction or gangrene [K40.90]  Hyperplastic polyp of appendix [K38.8]    LAPAROSCOPIC  APPENDECTOMY UMBILICAL HERNIA REPAIR    Anesthesia Procedure: LAPAROSCOPIC  APPENDECTOMY UMBILICAL HERNIA REPAIR (Abdomen)    Surgeons and Role:     * Sena Wilkins MD - Primary    Pre-op vitals reviewed.  Temp: 98.4 °F (36.9 °C)  Pulse: 80  Resp: 16  BP: 148/87  SpO2: 100 %  Body mass index is 29.76 kg/m².    Current medications reviewed.  Hospital Medications:  Current Medications[1]    Outpatient Medications:   Prescriptions Prior to Admission[2]    Allergies: Patient has no known allergies.      Anesthesia Evaluation        Anesthetic Complications           GI/Hepatic/Renal      (-) GERD       (-) chronic renal disease   (-) liver disease                 Cardiovascular                  (+) hypertension and well controlled      (-) past MI  (-) CABG/stent  (-) pacemaker/AICD  (-) valvular problems/murmurs     (-) dysrhythmias   (-) CHF  (-) angina              Endo/Other      (-) diabetes                            Pulmonary      (+) asthma (no inhaler for approximately 15 years)  (-) COPD                   Neuro/Psych          (-) CVA     (-) seizures                       Past Surgical History[3]  Social Hx on file[4]  History   Drug Use Unknown     Available pre-op labs reviewed.     Lab Results   Component Value Date     04/23/2025    K 4.2 04/23/2025     04/23/2025    CO2 26.0 04/23/2025    BUN 24 (H) 04/23/2025    CREATSERUM 1.17 04/23/2025    GLU 87 04/23/2025    CA 10.7 (H) 04/23/2025            Airway      Mallampati: II  Mouth opening: 3 FB  TM distance: 4 - 6 cm  Neck ROM: full Cardiovascular    Cardiovascular exam normal.          Dental             Pulmonary    Pulmonary exam normal.                 Other findings              ASA: 2   Plan: general  NPO status verified and           Plan/risks discussed with: patient                Present on Admission:  **None**             [1]    [Transfer Hold] acetaminophen (Tylenol Extra Strength) tab 1,000 mg  1,000 mg Oral Once    [Transfer Hold] scopolamine (Transderm-Scop) 1 MG/3DAYS patch 1 patch  1 patch Transdermal Once    lactated ringers infusion   Intravenous Continuous    ceFAZolin (Ancef) 2g in 10mL IV syringe premix  2 g Intravenous Once    And    metroNIDAZOLE in sodium chloride 0.79% (Flagyl) 5 mg/mL IVPB premix 500 mg  500 mg Intravenous Once   [2]   Medications Prior to Admission   Medication Sig Dispense Refill Last Dose/Taking    losartan 50 MG Oral Tab Take 1 tablet (50 mg total) by mouth daily. (Patient taking differently: Take 1 tablet (50 mg total) by mouth at bedtime.) 90 tablet 1 4/29/2025    tadalafil 5 MG Oral Tab Take 1 tablet (5 mg total) by mouth daily as needed.   More than a month    Albuterol Sulfate HFA (PROAIR HFA) 108 (90 BASE) MCG/ACT Inhalation Aero Soln Inhale 2 puffs into the lungs every 6 (six) hours as needed for Wheezing. 1 Inhaler 1 More than a month   [3]   Past Surgical History:  Procedure Laterality Date    Colonoscopy      Other surgical history  01/01/2004    spermatic cord excision of varicocele    Vasectomy  01/01/2010   [4]   Social History  Socioeconomic History    Marital status: Single   Tobacco Use    Smoking status: Never    Smokeless tobacco: Never   Vaping Use    Vaping status: Never Used   Substance and Sexual Activity    Alcohol use: Yes     Alcohol/week: 1.0 standard drink of alcohol     Types: 1 Standard drinks or equivalent per week     Comment: occ    Drug use: Never    Sexual activity: Yes     Partners: Female   Other Topics Concern    Caffeine Concern Yes    Exercise No

## 2025-05-15 ENCOUNTER — OFFICE VISIT (OUTPATIENT)
Facility: LOCATION | Age: 50
End: 2025-05-15
Payer: COMMERCIAL

## 2025-05-15 VITALS
HEART RATE: 77 BPM | TEMPERATURE: 99 F | SYSTOLIC BLOOD PRESSURE: 153 MMHG | DIASTOLIC BLOOD PRESSURE: 96 MMHG | OXYGEN SATURATION: 97 %

## 2025-05-15 DIAGNOSIS — Z98.890 POST-OPERATIVE STATE: Primary | ICD-10-CM

## 2025-05-15 NOTE — PROGRESS NOTES
Post Operative Visit Note       Active Problems  1. Post-operative state         Chief Complaint   Chief Complaint   Patient presents with    Post-Op      PO - LAPAROSCOPIC  APPENDECTOMY, UMBILICAL HERNIA REPAIR W/ LEH 5/1. Patient states little pain at naval.            History of Present Illness   The patient presents today for postoperative visit following Laparoscopic appendectomy and umbilical hernia repair on 5/1/25 by Dr. Wilkins.    The patient states that since his surgery that he is overall doing very well.  He denies complaints today.  He state he is tolerating a general diet, denies nausea or vomiting.  He denies diarrhea or constipation.  He denies fever or chills.  He states that his incisions are healing well.  There is no redness or drainage noted.     The patient recently underwent ultrasound of bilateral groin, ordered by Dr. Wilkins.  Ultrasound was notable for small right inguinal hernia, measuring 7 X 3 mm as well as right inguinal lymph node measuring 18X 11X 11 mm.  Patient states he continues to feel a small bump in his right groin region.  He denies any pain to the area. He states bump has not changed in size. Patient denies any constitutional symptoms.    Allergies  Cooper has no known allergies.    Past Medical / Surgical / Social / Family History    The past medical and past surgical history have been reviewed by me today.     Past Medical History:    Anxiety    Asthma (HCC)    Back pain    Cervicalgia    Depression    Essential hypertension    Feeling lonely    High blood pressure    Hx of motion sickness    KERWIN (obstructive sleep apnea)    AHI 8.4 Supine AHI 5.4  Sao2 Valdemar 82.7%     Pain in joints    Hips    Sleep apnea    cpap    Stress    Visual impairment    contact lens    Wears glasses     Past Surgical History:   Procedure Laterality Date    Appendectomy      Colonoscopy      Hernia surgery      Other surgical history  01/01/2004    spermatic cord excision of varicocele     Vasectomy  01/01/2010       The family history and social history have been reviewed by me today.    Family History   Problem Relation Age of Onset    Heart Attack Maternal Grandfather     Asthma Brother     Other (CHF) Maternal Grandmother     High Cholesterol Mother     Hypertension Mother     Hypertension Brother     Asthma Brother      Social History     Socioeconomic History    Marital status: Single   Tobacco Use    Smoking status: Never    Smokeless tobacco: Never   Vaping Use    Vaping status: Never Used   Substance and Sexual Activity    Alcohol use: Yes     Alcohol/week: 1.0 standard drink of alcohol     Types: 1 Standard drinks or equivalent per week     Comment: occ    Drug use: Never    Sexual activity: Yes     Partners: Female   Other Topics Concern    Caffeine Concern Yes    Exercise No        Current Outpatient Medications:     losartan 50 MG Oral Tab, Take 1 tablet (50 mg total) by mouth daily. (Patient taking differently: Take 1 tablet (50 mg total) by mouth at bedtime.), Disp: 90 tablet, Rfl: 1    tadalafil 5 MG Oral Tab, Take 1 tablet (5 mg total) by mouth daily as needed., Disp: , Rfl:     Albuterol Sulfate HFA (PROAIR HFA) 108 (90 BASE) MCG/ACT Inhalation Aero Soln, Inhale 2 puffs into the lungs every 6 (six) hours as needed for Wheezing., Disp: 1 Inhaler, Rfl: 1      Review of Systems  A 10 point Review of Systems has been completed by me today and is negative except as above in the HPI.    Physical Findings   BP (!) 153/96 (BP Location: Left arm, Patient Position: Sitting, Cuff Size: adult)   Pulse 77   Temp 98.7 °F (37.1 °C)   SpO2 97%   Gen/psych: alert and oriented, cooperative, no apparent distress  Cardiovascular: regular rate  Respiratory: respirations unlabored, no wheeze  Abdominal: soft, non-tender, non-distended, no guarding/rebound  Incisions: laparoscopic incision sites are well approximated, clean, dry, intact. No surrounding erythema, no exudates.  Physical Exam  Abdominal:           Comments: There is a small, 1 cm x 1 cm palpable nodule. No change in size with valsalva.      Medical chaperone present for physical exam.         Assessment/Plan  1. Post-operative state        Patient is doing well postoperatively following laparoscopic appendectomy with umbilical hernia repair.   Patient may continue general diet, no restrictions  Continue to keep incisions clean and dry, allowing soap and water to rinse over incisions during showering.  Pathology results discussed with patient in office today.   Unilateral inguinal lymphadenopathy notable to recent ultrasound imaging and exam today. There is no evidence of widespread lymphadenopathy, no constitutional symptoms, fever or chills, or unintentional weight loss. Recommend patient to continue to follow with primary care provider.  All of the patient's questions and concerns were addressed at today's visit.  Patient will follow-up with Dr. Wilkins on 6/4/2025, sooner if needed.       No orders of the defined types were placed in this encounter.       Imaging & Referrals   None    Follow Up  Return if symptoms worsen or fail to improve.    Janneth Cordova PA-C  [facility] General Surgery  5/15/2025  9:00 AM

## 2025-05-20 PROBLEM — E04.2 MULTINODULAR GOITER: Status: ACTIVE | Noted: 2025-05-20

## 2025-05-20 PROBLEM — E05.90 HYPERTHYROIDISM: Status: ACTIVE | Noted: 2025-05-20

## 2025-05-20 NOTE — PATIENT INSTRUCTIONS
Return Visit   [x] Dr. Fernandez in 6 weeks            [x] Central scheduling # for ultrasound/nuclear med/CT/MRI/DXA/IR      PLAN:    Please complete thyroid blood work (does not need to be fasting):  [x] Today/this week    If you're taking biotin, you should stop taking it 7 days prior to your thyroid blood draw. Biotin doesn't impact your thyroid function, but it can effect the way the lab tests your blood so you should avoid taking it 7 days before.    Imaging/testing:   [] No further imaging/testing needed   [x] Uptake and scan ordered   [] Thyroid ultrasound ordered  [] FNA biopsy      To schedule tests:   Call Central [phone] for FNA and uptake and scan scheduling  Thyroid ultrasounds can be scheduled on the [facility] thang.   [facility] is another affordable option in the area to complete ultrasound- you can call them directly to schedule, they will have our order in the system already.  [phone]  Blood tests can be done at any [place] lab location, or at [facility] if that is what you requested.  Most of the labs are walk in meaning you don't need an appointment. You can schedule these on the [facility] th[facility] a walk in lab to have an appointment ready.  Here are the lab locations: [email or web address]       General follow up information:  Please let us know if you require any refills at least 1 week prior to your medication running out. If you do run out of medication, please call our office ASAP to request refills (do not wait until your follow up).  Please call us if you experience any problems with insurance coverage of medication, lab work, or imaging.   Lab results and imaging will typically be reviewed at follow up appointments, or within 3-5 business days of ALL results being in if you do not have an appointment scheduled in the near future. Our office will contact you for any abnormal results requiring more urgent follow up or action.   The on-call pager is for  urgent matters only. If you are a type 1 diabetic and run out of insulin after business hours 8AM-4PM, you may call the on-call pager for a refill to a 24 hour pharmacy. If you have adrenal insufficiency and run out of steroids, you may call the on-call pager for a refill to a 24 hour pharmacy. All other refill requests should be requested during business hours.  Office phone number: [phone]  Office hours: Monday-Friday, closed on holidays  Phones open 8:30am-4:30pm    Mercy Fernandez MD  [facility]   Office phone number: [phone]  Fax number: [phone]

## 2025-05-20 NOTE — PROGRESS NOTES
REASON FOR CONSULTATION:    Chief Complaint   Patient presents with    New Patient     NP, pt here for tyroid issues, pt had abnormal labs done on 12/10/2024, no other concerns          PHYSICIAN REQUESTING CONSULTATION:    Tim Paul MD      HPI:    Jeffrey M Fritsch is a 49 year old male with past medical history of HTN, HLD, asthma,  KERWIN on CPAP who presents for consultation regarding hyperthyroidism and multinodular goiter      Hyperthyroidism:   Diagnosis: Noted on labs in 9/2024, patient was asymptomatic  Presenting symptoms: None  Anti-thyroid medications:  Methimazole:No  Beta blocker:No  Compliance:NA  Labs:  TFTs:  Lab Results   Component Value Date    TSH 0.296 (L) 12/10/2024    TSH 0.415 (L) 09/27/2024    TSH 0.395 04/13/2023    T4F 0.9 12/10/2024    T4F 0.9 09/27/2024    T3F 3.46 09/27/2024    WBC 4.6 09/27/2024    WBC 4.5 04/13/2023    WBC 5.9 06/02/2020    NE 2.45 09/27/2024    NE 2.30 04/13/2023    NE 3.58 06/02/2020    AST 21 09/27/2024    AST 16 04/13/2023    AST 25 06/02/2020    ALT 23 09/27/2024    ALT 27 04/13/2023    ALT 38 06/02/2020    CHOLEST 186 09/27/2024     (H) 09/27/2024    TRIG 78 09/27/2024      Thyroid antibodies if any:None  Thyroid Ultrasound performed:  US THYROID (CPT=76536)  Result Date: 12/19/2024  PROCEDURE:  US THYROID (CPT=76536)  COMPARISON:  None.  INDICATIONS:  E04.1 Thyroid nodule  TECHNIQUE:  High-resolution ultrasound of the thyroid gland was performed.  PATIENT STATED HISTORY: (As transcribed by Technologist)     FINDINGS:   MEASUREMENTS: RIGHT LOBE:  6.0 x 1.6 x 2.0 cm LEFT LOBE:  8.6 x 3.3 x 4.8 cm ISTHMUS:  1.0 cm  NODULES:   1. Right inferior, 1.0 x 0.6 x 0.8 cm.  Solid, hypoechoic, wider than tall, lobular margin, punctate echogenic foci.TR5 - Highly Suspicious (FNA if > or = 1 cm.  Follow if > or = 0.5 cm.).   2. Left superior, 6.0 x 2.9 x 4.5 cm.  Predominantly solid, isoechoic, wider than tall, smoothly marginated, no echogenic foci.  TR3 - Mildly  Suspicious (FNA if > or = 2.5 cm.  Follow if > or = 1.5 cm.).   OTHER:  Diffuse thyroid parenchymal heterogeneity.            CONCLUSION:   Diffuse thyroid enlargement with single bilateral thyroid nodules.  Per ACR guidelines, recommend fine needle aspiration of both nodules.   ACR TI-RADS recommendations: TR5 - FNA if greater than or equal to 1 cm, follow-up if 0.5-0.9 cm every year for 5 years. TR4 - FNA if greater than or equal to 1.5 cm, follow-up if 1-1.4 cm in 1, 2, 3 and 5 years. TR3 - FNA if greater than or equal to 2.5 cm, follow-up if 1.5-2.4 cm in 1, 3 and 5 years TR1 and TR2 - no FNA or follow-up  Please note ACR TI-RADS recommendations are based on imaging features and size of the nodule only.  In certain clinical circumstances additional or alternative evaluation may be indicated.   LOCATION:  [place] [id number]        Dictated by (CST): Angel Medina MD on 12/19/2024 at 8:45 AM     Finalized by (CST): Angel Medina MD on 12/19/2024 at 8:58 AM        Nuclear medicine uptake scan performed:None  Current symptoms:  Unintentional weight loss: weight gain  Heat intolerance/ diaphoresis: occasionally   Palpitations/SOB/ chest pain: no  Fatigue: yes  Mood: yes, endorses significant stress, has baseline anxiety, insomnia  Diarrhea/ frequent bowel movements: loose stools for a month after hearnia surgery   Menstrual cycle:NA  Skin/hair/nail changes:None  Eye symptoms if any:none  Neck swelling:yes  Compressive symptoms: No Dysphagia, voice changes, SOB  Family history of thyroid disorders:No  Exposure to XRT to head and neck or ionizing radiation in childhood: no  Recent steroids,contrast exposure/amiodarone/ immune therapy/biotin/herbal supplements: no  Recent vaccines/viral symptoms: none  Pregnancy plans if applicable: NA      Initial diagnosis: 11/2024 thyroid swelling was first noted during physical, follow CT neck showed thyroid nodule  12/2024 CT neck   NECK GLANDS:  There is a large heterogeneous mass/enlargement  left thyroid lobe which measures 8.4 x 4.4 cm.  This accounts for the palpable finding.  Additional evaluation with thyroid ultrasound and probably biopsy are recommended.   Thyroid US:  US THYROID (CPT=76536)  Result Date: 12/19/2024  CONCLUSION:   Diffuse thyroid enlargement with single bilateral thyroid nodules.  Per ACR guidelines, recommend fine needle aspiration of both nodules.   ACR TI-RADS recommendations: TR5 - FNA if greater than or equal to 1 cm, follow-up if 0.5-0.9 cm every year for 5 years. TR4 - FNA if greater than or equal to 1.5 cm, follow-up if 1-1.4 cm in 1, 2, 3 and 5 years. TR3 - FNA if greater than or equal to 2.5 cm, follow-up if 1.5-2.4 cm in 1, 3 and 5 years TR1 and TR2 - no FNA or follow-up  Please note ACR TI-RADS recommendations are based on imaging features and size of the nodule only.  In certain clinical circumstances additional or alternative evaluation may be indicated.   LOCATION:  [place] [id number]        Dictated by (CST): Angel Medina MD on 12/19/2024 at 8:45 AM     Finalized by (CST): Angel Medina MD on 12/19/2024 at 8:58 AM     Patient is s/p evaluation by ENT Dr Keating in 1/2025,recommending evaluation for hyperthyroid and conservative management for now  History of thyroid FNA:   1/2025     Thyroid levels:See above  Thyroid antibodies if any:None  Neck swelling: yes  Compressive symptoms:  Voice changes: no? May be hoarse, attributes to age  Dysphagia: no  SOB: no        ROS      Review of Systems   Constitutional: Negative.    Respiratory: Negative.     Cardiovascular: Negative.    Gastrointestinal:  Positive for diarrhea.   Skin: Negative.    Neurological: Negative.    Psychiatric/Behavioral:  The patient is nervous/anxious.          Past Medical History:    Anxiety    Asthma (HCC)    Back pain    Cervicalgia    Depression    Essential hypertension    Feeling lonely    High blood pressure    Hx of motion sickness    KERWIN (obstructive sleep apnea)    AHI 8.4 Supine AHI 5.4  Sao2 Valdemar  82.7%     Pain in joints    Hips    Sleep apnea    cpap    Stress    Visual impairment    contact lens    Wears glasses         Past Surgical History:   Procedure Laterality Date    Appendectomy      Colonoscopy      Hernia surgery      Other surgical history  01/01/2004    spermatic cord excision of varicocele    Vasectomy  01/01/2010         Current Outpatient Medications   Medication Sig Dispense Refill    losartan 50 MG Oral Tab Take 1 tablet (50 mg total) by mouth daily. (Patient taking differently: Take 1 tablet (50 mg total) by mouth at bedtime.) 90 tablet 1    tadalafil 5 MG Oral Tab Take 1 tablet (5 mg total) by mouth daily as needed.      Albuterol Sulfate HFA (PROAIR HFA) 108 (90 BASE) MCG/ACT Inhalation Aero Soln Inhale 2 puffs into the lungs every 6 (six) hours as needed for Wheezing. 1 Inhaler 1         No Known Allergies      Social History     Socioeconomic History    Marital status: Single   Tobacco Use    Smoking status: Never    Smokeless tobacco: Never   Vaping Use    Vaping status: Never Used   Substance and Sexual Activity    Alcohol use: Yes     Alcohol/week: 1.0 standard drink of alcohol     Types: 1 Standard drinks or equivalent per week     Comment: occ    Drug use: Never    Sexual activity: Yes     Partners: Female   Other Topics Concern    Caffeine Concern Yes    Exercise No     Social Drivers of Health      [facility]    Housing Stability         Family History   Problem Relation Age of Onset    Heart Attack Maternal Grandfather     Asthma Brother     Other (CHF) Maternal Grandmother     High Cholesterol Mother     Hypertension Mother     Hypertension Brother     Asthma Brother            PHYSICAL EXAM:      /80   Pulse 82   Resp 18   Ht 5' 7\" (1.702 m)   Wt 188 lb 6.4 oz (85.5 kg)   SpO2 99%   BMI 29.51 kg/m²       Wt Readings from Last 3 Encounters:   06/03/25 188 lb 6.4 oz (85.5 kg)   05/01/25 190 lb (86.2 kg)   03/06/25 182 lb (82.6 kg)          Physical Exam  Constitutional:       Appearance: He is well-developed.   Neck:      Thyroid: Thyroid mass and thyromegaly present.      Comments: Left thyroid nodule ~ 5 cm, mobile, non tender, left thyroid nodule, difficult to delienate  Cardiovascular:      Rate and Rhythm: Normal rate and regular rhythm.      Heart sounds: Normal heart sounds.   Pulmonary:      Effort: Pulmonary effort is normal.      Breath sounds: Normal breath sounds.   Abdominal:      General: Bowel sounds are normal.      Palpations: Abdomen is soft.   Skin:     General: Skin is warm and dry.   Neurological:      Mental Status: He is alert and oriented to person, place, and time.      Deep Tendon Reflexes: Reflexes are normal and symmetric.           LABS:See HPI                IMAGING REVIEWED :   12/2024 thyroid US  MEASUREMENTS:   RIGHT LOBE:  6.0 x 1.6 x 2.0 cm   LEFT LOBE:  8.6 x 3.3 x 4.8 cm   ISTHMUS:  1.0 cm      NODULES:       1. Right inferior, 1.0 x 0.6 x 0.8 cm.  Solid, hypoechoic, wider than tall, lobular margin, punctate echogenic foci.TR5 - Highly Suspicious (FNA if > or = 1 cm.  Follow if > or = 0.5 cm.).       2. Left superior, 6.0 x 2.9 x 4.5 cm.  Predominantly solid, isoechoic, wider than tall, smoothly marginated, no echogenic foci.  TR3 - Mildly Suspicious (FNA if > or = 2.5 cm.  Follow if > or = 1.5 cm.).           ASSESSMENT/PLAN:      Diagnoses and all orders for this visit:    Multinodular goiter  Compressive symptoms: none  Last TSH: 0.29 12/2024  Thyroid US from 12/2024, showed MNG, TR5 left thyroid nodule measuring 6 cm, TR 3 right thyroid nodule 1 cm   Patient is s/p FNA of bilateral nodules in 1/2025, pathology was benign  Patient is s/p ENT evaluation in 1/2025, recommending further evaluation of hyperthyroid and conservative Rx, pending work up or onset of symptoms  Obtain thyroid uptake scan to evaluate for toxic MNG  We discussed risk of false negatives on FNA in thyroid nodules measuring > 4 cm and  role for potential lobectomy , for definitive dx pending thyroid uptake scan results  Reviewed in detail risk factors, pathophysiology, natural history and complications of thyroid nodules  Pt has no high-risk factors for thyroid cancer (fam hx of thyroid cancer in 1st degree relative, h/o head/neck irradiation, exposure to ionizing radiation as child, or prior h/o thyroid cancer).   Update thyroid US in 12/2025, sooner if symptomomatic  Hyperthyroidism  Patient is clinically euthyroid  Biochemical evaluation demonstrates subclinical hyperthyroid starting 9/2024  Differential etiology includes Graves' disease vs toxic multinodular goiter vs thyroiditis  Secondary causes of low TSH use of levothyroxine suppressive doses, glucocorticoids, opioids ruled out.   No history of recent illness that could cause sick euthyroid syndrome  We discussed hyperthyroidism in detail, including symptoms, pathophysiology, diagnosis, treatment options, and monitoring through blood tests  Will obtain thyroid levels this visit   Will obtain thyroid antibodies to evaluate for Grave's disease   Will obtain nuclear medicine uptake scan to evaluate for toxic MNG  We discussed indications for treatment of subclinical hyperthyroidism if hyperthyroid persists per latest YAHIR guidelines  - treatment criteria: TSH < 0.1, TSH 0.1 to 0.5 with age > 65, osteoporosis, atrial fibrillation, hyperthyroid symptoms   Primary hypertension  We discussed goals for target BP  BP Readings from Last 1 Encounters:   06/03/25 132/80   BP Meds: losartan Tabs - 50 MG     Continue current therapy  Advised following up with PCP if out of range.         Return in about 6 weeks (around 7/15/2025).      Thank you for allowing me to participate in the care of your patient. Please call if you have any questions or concerns.    The above plan was discussed in detail with the patient who verbalized understanding and agreement.      A total of 62 minutes was spent today on  obtaining history, reviewing pertinent labs, reviewing relevant pathophysiology with patient, reviewing outside records, evaluating patient, providing multiple treatment options, completing documentation and orders, and communicating with other providers as appropriate.     Mercy Fernandez MD  [facility]       In reviewing this note, please be advised that Dragon Voice Recognition software used to dictate the note may have made errors in recognizing some of the words or phrases.     Note to patient: The 21 Century Cures Act makes medical notes like these available to patients in the interest of transparency. However, be advised this is a medical document. It is intended as peer to peer communication. It is written in medical language and may contain abbreviations or verbiage that are unfamiliar. It may appear blunt or direct. Medical documents are intended to carry relevant information, facts as evident, and the clinical opinion of the practitioner.

## 2025-06-03 ENCOUNTER — OFFICE VISIT (OUTPATIENT)
Facility: CLINIC | Age: 50
End: 2025-06-03
Payer: COMMERCIAL

## 2025-06-03 VITALS
HEART RATE: 82 BPM | HEIGHT: 67 IN | WEIGHT: 188.38 LBS | BODY MASS INDEX: 29.57 KG/M2 | DIASTOLIC BLOOD PRESSURE: 80 MMHG | RESPIRATION RATE: 18 BRPM | SYSTOLIC BLOOD PRESSURE: 132 MMHG | OXYGEN SATURATION: 99 %

## 2025-06-03 DIAGNOSIS — E05.90 HYPERTHYROIDISM: ICD-10-CM

## 2025-06-03 DIAGNOSIS — I10 PRIMARY HYPERTENSION: ICD-10-CM

## 2025-06-03 DIAGNOSIS — E04.2 MULTINODULAR GOITER: Primary | ICD-10-CM

## 2025-06-03 PROCEDURE — 3079F DIAST BP 80-89 MM HG: CPT | Performed by: INTERNAL MEDICINE

## 2025-06-03 PROCEDURE — 3075F SYST BP GE 130 - 139MM HG: CPT | Performed by: INTERNAL MEDICINE

## 2025-06-03 PROCEDURE — 3008F BODY MASS INDEX DOCD: CPT | Performed by: INTERNAL MEDICINE

## 2025-06-03 PROCEDURE — 99205 OFFICE O/P NEW HI 60 MIN: CPT | Performed by: INTERNAL MEDICINE

## 2025-06-04 ENCOUNTER — OFFICE VISIT (OUTPATIENT)
Dept: SURGERY | Facility: CLINIC | Age: 50
End: 2025-06-04
Payer: COMMERCIAL

## 2025-06-04 VITALS
SYSTOLIC BLOOD PRESSURE: 126 MMHG | DIASTOLIC BLOOD PRESSURE: 86 MMHG | OXYGEN SATURATION: 97 % | HEART RATE: 77 BPM | TEMPERATURE: 98 F

## 2025-06-04 DIAGNOSIS — K40.90 NON-RECURRENT UNILATERAL INGUINAL HERNIA WITHOUT OBSTRUCTION OR GANGRENE: Primary | ICD-10-CM

## 2025-06-04 DIAGNOSIS — D12.6 SESSILE SERRATED POLYP OF COLON: ICD-10-CM

## 2025-06-04 PROCEDURE — 3074F SYST BP LT 130 MM HG: CPT | Performed by: STUDENT IN AN ORGANIZED HEALTH CARE EDUCATION/TRAINING PROGRAM

## 2025-06-04 PROCEDURE — 99024 POSTOP FOLLOW-UP VISIT: CPT | Performed by: STUDENT IN AN ORGANIZED HEALTH CARE EDUCATION/TRAINING PROGRAM

## 2025-06-04 PROCEDURE — 3079F DIAST BP 80-89 MM HG: CPT | Performed by: STUDENT IN AN ORGANIZED HEALTH CARE EDUCATION/TRAINING PROGRAM

## 2025-06-04 NOTE — PROGRESS NOTES
Patient ID: Jeffrey M Fritsch is a 49 year old male.    Chief Complaint   Patient presents with    Post-Op      PO - LAPAROSCOPIC  APPENDECTOMY, UMBILICAL HERNIA REPAIR W/ JACQUELINE 5/1       HPI: Jeffrey M Fritsch is a 49 year old male presents for follow-up.  Since surgery, patient denies any issues.  He is tolerating regular diet and having normal bowel function.  He still experiences the bulge in his right groin but denies any pain with activity or lifting.  He still has pain in his right hip but is overall getting better with daily stretching.    Workup: None      Past Medical History  Past Medical History[1]    Past Surgical History  Past Surgical History[2]    Medications  Current Medications[3]    Allergies  Allergies[4]    Social History  History   Smoking Status    Never   Smokeless Tobacco    Never     History   Alcohol Use    1.0 standard drink of alcohol/week    1 Standard drinks or equivalent per week     Comment: occ     History   Drug Use Unknown       Family History  Family History[5]    Review of Systems  Review of Systems   Constitutional: Negative.    Respiratory: Negative.     Cardiovascular: Negative.    Gastrointestinal: Negative.  Negative for abdominal distention, abdominal pain, constipation, nausea and vomiting.       Exam  Vitals:    06/04/25 1342   BP: 126/86   Pulse: 77   Temp: 97.6 °F (36.4 °C)     Physical Exam  Constitutional:       Appearance: Normal appearance.   Cardiovascular:      Rate and Rhythm: Normal rate.   Pulmonary:      Effort: Pulmonary effort is normal.   Abdominal:      General: There is no distension.      Palpations: Abdomen is soft.      Tenderness: There is no abdominal tenderness.      Hernia: A hernia is present. Hernia is present in the right inguinal area.          Comments: Surgical incisions healing well, no erythema or drainage   Musculoskeletal:         General: Normal range of motion.   Skin:     General: Skin is warm and dry.   Neurological:      Mental  Status: He is alert and oriented to person, place, and time.           Assessment/Plan  Assessment   Problem List Items Addressed This Visit          Gastrointestinal and Abdominal    Non-recurrent unilateral inguinal hernia without obstruction or gangrene - Primary    Sessile serrated polyp of colon       Jeffrey M Fritsch is a 49 year old male with sessile serrated polyp of the appendix, right inguinal hernia    Pathology was reviewed by me  7 mm sessile serrated polyp completely removed  Patient will be due for repeat colonoscopy in 3 years with Dr. Suárez    On physical exam, patient continues to have a small right reducible inguinal hernia  Hernia is nontender  Patient's main complaint is pain on the lateral aspect and in his hip  He thinks overall his pain is getting better with exercising  No acute surgical invention at this time  Symptoms of inguinal hernia discussed with the patient  These include pain or discomfort with heavy lifting and activity  Symptoms of incarceration also discussed with the patient and these include hernia that is painful and stuck/not reducible  If patient has any of these symptoms, he is to reach out to my office as he may need surgery sooner    Patient can follow-up as needed  He can call the office for any questions or concerns    Thank you for letting me participate in the care of this patient      Sena Wilkins MD  General Surgery  [facility]     CC:  Tim Paul MD         [1]   Past Medical History:   Anxiety    Asthma (HCC)    Back pain    Cervicalgia    Depression    Essential hypertension    Feeling lonely    High blood pressure    Hx of motion sickness    KERWIN (obstructive sleep apnea)    AHI 8.4 Supine AHI 5.4  Sao2 Valdemar 82.7%     Pain in joints    Hips    Sleep apnea    cpap    Stress    Visual impairment    contact lens    Wears glasses   [2]   Past Surgical History:  Procedure Laterality Date    Appendectomy      Colonoscopy      Hernia surgery      Other  surgical history  01/01/2004    spermatic cord excision of varicocele    Vasectomy  01/01/2010   [3]   Current Outpatient Medications   Medication Sig Dispense Refill    losartan 50 MG Oral Tab Take 1 tablet (50 mg total) by mouth daily. 90 tablet 1    tadalafil 5 MG Oral Tab Take 1 tablet (5 mg total) by mouth daily as needed.      Albuterol Sulfate HFA (PROAIR HFA) 108 (90 BASE) MCG/ACT Inhalation Aero Soln Inhale 2 puffs into the lungs every 6 (six) hours as needed for Wheezing. 1 Inhaler 1   [4] No Known Allergies  [5]   Family History  Problem Relation Age of Onset    Heart Attack Maternal Grandfather     Asthma Brother     Other (CHF) Maternal Grandmother     High Cholesterol Mother     Hypertension Mother     Hypertension Brother     Asthma Brother

## 2025-07-01 RX ORDER — LOSARTAN POTASSIUM 50 MG/1
50 TABLET ORAL DAILY
Qty: 90 TABLET | Refills: 3 | Status: SHIPPED | OUTPATIENT
Start: 2025-07-01

## 2025-07-01 NOTE — TELEPHONE ENCOUNTER
Refill Per Protocol     Requested Prescriptions   Pending Prescriptions Disp Refills    LOSARTAN 50 MG Oral Tab [Pharmacy Med Name: LOSARTAN 50MG TABLETS] 90 tablet 1     Sig: TAKE 1 TABLET(50 MG) BY MOUTH DAILY       Hypertension Medications Protocol Passed - 7/1/2025  4:17 PM        Passed - CMP or BMP in past 12 months        Passed - Last BP reading less than 140/90     BP Readings from Last 1 Encounters:   06/04/25 126/86               Passed - In person appointment or virtual visit in the past 12 mos or appointment in next 3 mos     Recent Outpatient Visits              3 weeks ago Non-recurrent unilateral inguinal hernia without obstruction or gangrene    [facility], [address] [place] [address] Sena Wilkins MD    Office Visit    4 weeks ago Multinodular goiter    [facility] Rodrigo Fernandez MD    Office Visit    1 month ago Post-operative state    [facility], [facility],Janneth Sofia PA-C    Office Visit    3 months ago Non-recurrent unilateral inguinal hernia without obstruction or gangrene    [facility], [facility] Sena Wilkins MD    Office Visit    5 months ago Thyroid nodule    [facility], [facility]    Office Visit          Future Appointments         Provider Department Appt Notes    In 3 weeks Rodrigo Fernandez MD [facility], [facility] 6 week f/u-Hyperthryoidism, Multinodular goiter,  LOV 6/3/25                    Passed - EGFRCR or GFRNAA > 50     GFR Evaluation  EGFRCR: 75 , resulted on 4/23/2025          Passed - Medication is active on med list

## 2025-07-24 ENCOUNTER — TELEPHONE (OUTPATIENT)
Facility: LOCATION | Age: 50
End: 2025-07-24

## 2025-07-24 NOTE — TELEPHONE ENCOUNTER
Patient called to speak with nurse. He wants to know what went on during his procedure with TRAVON Wilkins on  05/01 for LAPAROSCOPIC  APPENDECTOMY, UMBILICAL HERNIA REPAIR   He states that when he woke up after procedure he had shoulder pain. He was told that gas that is used can cause the pain, but it would go away. He states that it never went away and that he got imaging done and he now has to have a rotator cuff repaired. He also has GI issues since procedure as well     Please advise

## 2025-07-24 NOTE — TELEPHONE ENCOUNTER
5/1/25 LAPAROSCOPIC  APPENDECTOMY, UMBILICAL HERNIA REPAIR w/LEH    Patient calling. States since surgery, he continues to have soft greasy floating stool \"snake-like,\" and continues to have a lot of gas, which he did not have prior to surgery.  Reports no changes in diet, activity, or medication.  C/o continued R shoulder pain, which he states he did not have prior to surgery.  States he was evaluated recently by Ortho Dr. Estuardo Tsai at [facility] and completed 7/21/25 CT Right Shoulder, which he reports shows recent complete R rotator tear, requiring surgery.  He is in possession of the imaging disc.    He states he mentioned his stool change and R shoulder during PO visits on 5/15 and 6/4. Clinical notes to do support this assertion.    States he is frustrated with the lack of attention this office seems to show of  his concerns.  Note: per file, this is the first post-op call to this office.

## 2025-07-25 NOTE — TELEPHONE ENCOUNTER
Spoke with patient and made an appointment with Dr Wilkins for 7/28.  Patient reiterated that he mentioned his shoulder pain and stool change at previous appointments and doesn't know why it was not in documentation.     Future Appointments   Date Time Provider Department Center   7/28/2025  1:30 PM Sena Wilkins MD [id number]   7/31/2025  8:00 AM  [facility] DOSE RM [facility]   7/31/2025  2:00 PM E[facility] RM1  [facility]   8/6/2025  8:30 AM Rodrigo Fernandez MD [facility]

## 2025-07-28 ENCOUNTER — TELEPHONE (OUTPATIENT)
Dept: ORTHOPEDICS CLINIC | Facility: CLINIC | Age: 50
End: 2025-07-28

## 2025-07-28 ENCOUNTER — OFFICE VISIT (OUTPATIENT)
Facility: LOCATION | Age: 50
End: 2025-07-28
Payer: COMMERCIAL

## 2025-07-28 VITALS
DIASTOLIC BLOOD PRESSURE: 100 MMHG | TEMPERATURE: 98 F | SYSTOLIC BLOOD PRESSURE: 162 MMHG | OXYGEN SATURATION: 99 % | HEART RATE: 76 BPM

## 2025-07-28 DIAGNOSIS — M79.601 RIGHT ARM PAIN: ICD-10-CM

## 2025-07-28 DIAGNOSIS — K90.9 STEATORRHEA (HCC): ICD-10-CM

## 2025-07-28 DIAGNOSIS — Z90.49 S/P LAPAROSCOPIC APPENDECTOMY: Primary | ICD-10-CM

## 2025-07-28 DIAGNOSIS — R14.3 EXCESSIVE FLATUS: ICD-10-CM

## 2025-07-28 DIAGNOSIS — M75.101 TEAR OF RIGHT ROTATOR CUFF, UNSPECIFIED TEAR EXTENT, UNSPECIFIED WHETHER TRAUMATIC: ICD-10-CM

## 2025-07-28 PROCEDURE — 99024 POSTOP FOLLOW-UP VISIT: CPT | Performed by: STUDENT IN AN ORGANIZED HEALTH CARE EDUCATION/TRAINING PROGRAM

## 2025-07-28 PROCEDURE — 3077F SYST BP >= 140 MM HG: CPT | Performed by: STUDENT IN AN ORGANIZED HEALTH CARE EDUCATION/TRAINING PROGRAM

## 2025-07-28 PROCEDURE — 3080F DIAST BP >= 90 MM HG: CPT | Performed by: STUDENT IN AN ORGANIZED HEALTH CARE EDUCATION/TRAINING PROGRAM

## 2025-07-28 NOTE — TELEPHONE ENCOUNTER
I called the pt and left a voice message to try to book him on Friday 08/1/25  for a shoulder injury with Dr. Fried.     Pls, if pt calls back scheduled him or double book him. Ok per     The pt will bring cd with images and report.

## 2025-07-31 ENCOUNTER — HOSPITAL ENCOUNTER (OUTPATIENT)
Dept: NUCLEAR MEDICINE | Facility: HOSPITAL | Age: 50
Discharge: HOME | End: 2025-07-31
Attending: INTERNAL MEDICINE

## 2025-07-31 ENCOUNTER — LAB ENCOUNTER (OUTPATIENT)
Dept: LAB | Facility: HOSPITAL | Age: 50
End: 2025-07-31
Attending: INTERNAL MEDICINE

## 2025-07-31 DIAGNOSIS — E05.90 HYPERTHYROIDISM: ICD-10-CM

## 2025-07-31 DIAGNOSIS — E04.2 MULTINODULAR GOITER: ICD-10-CM

## 2025-07-31 LAB
T3FREE SERPL-MCNC: 3.54 PG/ML (ref 2.4–4.2)
T4 FREE SERPL-MCNC: 0.9 NG/DL (ref 0.8–1.7)
THYROGLOB SERPL-MCNC: 19 U/ML (ref ?–60)
THYROPEROXIDASE AB SERPL-ACNC: <28 U/ML (ref ?–60)
TSI SER-ACNC: 0.57 UIU/ML (ref 0.55–4.78)

## 2025-07-31 PROCEDURE — 84481 FREE ASSAY (FT-3): CPT

## 2025-07-31 PROCEDURE — 84445 ASSAY OF TSI GLOBULIN: CPT

## 2025-07-31 PROCEDURE — 83520 IMMUNOASSAY QUANT NOS NONAB: CPT

## 2025-07-31 PROCEDURE — 78014 THYROID IMAGING W/BLOOD FLOW: CPT | Performed by: INTERNAL MEDICINE

## 2025-07-31 PROCEDURE — 84439 ASSAY OF FREE THYROXINE: CPT

## 2025-07-31 PROCEDURE — 86376 MICROSOMAL ANTIBODY EACH: CPT

## 2025-07-31 PROCEDURE — 86800 THYROGLOBULIN ANTIBODY: CPT

## 2025-07-31 PROCEDURE — 84443 ASSAY THYROID STIM HORMONE: CPT

## 2025-07-31 PROCEDURE — 36415 COLL VENOUS BLD VENIPUNCTURE: CPT

## 2025-08-01 ENCOUNTER — OFFICE VISIT (OUTPATIENT)
Dept: ORTHOPEDICS CLINIC | Facility: CLINIC | Age: 50
End: 2025-08-01

## 2025-08-01 VITALS — BODY MASS INDEX: 27.42 KG/M2 | WEIGHT: 183 LBS | HEIGHT: 68.4 IN

## 2025-08-01 DIAGNOSIS — S43.431A SUPERIOR GLENOID LABRUM LESION OF RIGHT SHOULDER, INITIAL ENCOUNTER: ICD-10-CM

## 2025-08-01 DIAGNOSIS — M75.121 NONTRAUMATIC COMPLETE TEAR OF RIGHT ROTATOR CUFF: Primary | ICD-10-CM

## 2025-08-01 DIAGNOSIS — M67.921 TENDINOPATHY OF RIGHT BICEPS TENDON: ICD-10-CM

## 2025-08-01 DIAGNOSIS — M75.41 SUBACROMIAL IMPINGEMENT OF RIGHT SHOULDER: ICD-10-CM

## 2025-08-01 DIAGNOSIS — M19.019 AC JOINT ARTHROPATHY: ICD-10-CM

## 2025-08-01 PROCEDURE — 99204 OFFICE O/P NEW MOD 45 MIN: CPT | Performed by: ORTHOPAEDIC SURGERY

## 2025-08-01 PROCEDURE — 3008F BODY MASS INDEX DOCD: CPT | Performed by: ORTHOPAEDIC SURGERY

## 2025-08-02 LAB
THY STIM IMMUNO: <0.1 IU/L
THYROTROPIN REC AB: <1.1 IU/L

## 2025-08-06 ENCOUNTER — OFFICE VISIT (OUTPATIENT)
Facility: CLINIC | Age: 50
End: 2025-08-06

## 2025-08-06 VITALS
HEIGHT: 68.4 IN | OXYGEN SATURATION: 97 % | BODY MASS INDEX: 27.66 KG/M2 | DIASTOLIC BLOOD PRESSURE: 70 MMHG | RESPIRATION RATE: 16 BRPM | WEIGHT: 184.63 LBS | HEART RATE: 76 BPM | SYSTOLIC BLOOD PRESSURE: 106 MMHG

## 2025-08-06 DIAGNOSIS — E04.2 MULTINODULAR GOITER: Primary | ICD-10-CM

## 2025-08-06 DIAGNOSIS — E05.90 HYPERTHYROIDISM: ICD-10-CM

## 2025-08-06 DIAGNOSIS — I10 PRIMARY HYPERTENSION: ICD-10-CM

## 2025-08-06 PROBLEM — E78.00 HYPERCHOLESTEROLEMIA: Status: ACTIVE | Noted: 2022-05-20

## 2025-08-06 PROBLEM — M76.51 PATELLAR TENDINITIS, RIGHT KNEE: Status: ACTIVE | Noted: 2023-06-14

## 2025-08-06 PROCEDURE — 99215 OFFICE O/P EST HI 40 MIN: CPT | Performed by: INTERNAL MEDICINE

## 2025-08-06 PROCEDURE — 3008F BODY MASS INDEX DOCD: CPT | Performed by: INTERNAL MEDICINE

## 2025-08-06 PROCEDURE — 3074F SYST BP LT 130 MM HG: CPT | Performed by: INTERNAL MEDICINE

## 2025-08-06 PROCEDURE — 3078F DIAST BP <80 MM HG: CPT | Performed by: INTERNAL MEDICINE

## (undated) DIAGNOSIS — Z00.00 ROUTINE GENERAL MEDICAL EXAMINATION AT A HEALTH CARE FACILITY: Primary | ICD-10-CM

## (undated) DIAGNOSIS — Z13.220 SCREENING FOR LIPID DISORDERS: ICD-10-CM

## (undated) DIAGNOSIS — Z13.29 SCREENING FOR THYROID DISORDER: ICD-10-CM

## (undated) DIAGNOSIS — Z13.0 SCREENING FOR DISORDER OF BLOOD AND BLOOD-FORMING ORGANS: ICD-10-CM

## (undated) DIAGNOSIS — Z13.228 SCREENING FOR METABOLIC DISORDER: ICD-10-CM

## (undated) DIAGNOSIS — Z12.5 SCREENING FOR MALIGNANT NEOPLASM OF PROSTATE: ICD-10-CM

## (undated) DEVICE — LAP CHOLE/APPY CDS-LF: Brand: MEDLINE INDUSTRIES, INC.

## (undated) DEVICE — POUCH SPECIMEN WIRE 6X3 250ML

## (undated) DEVICE — DALE ABDOMINAL BINDER, 12" WIDE, STRETCHES TO FIT 30"-45", 1 PER BOX.: Brand: DALE ABDOMINAL BINDER

## (undated) DEVICE — VIOLET BRAIDED (POLYGLACTIN 910), SYNTHETIC ABSORBABLE SUTURE: Brand: COATED VICRYL

## (undated) DEVICE — GLOVE SUR 6.5 SENSICARE PI PIP CRM PWD F

## (undated) DEVICE — #15 STERILE STAINLESS BLADE: Brand: STERILE STAINLESS BLADES

## (undated) DEVICE — GLOVE SUR 6.5 SENSICARE PI PIP GRN PWD F

## (undated) DEVICE — SYRINGE MED 10ML LL TIP W/O SFTY DISP

## (undated) DEVICE — APPLICATOR PREP 26ML CHG 2% ISO ALC 70%

## (undated) DEVICE — SOLUTION IRRIG 1000ML 0.9% NACL USP BTL

## (undated) DEVICE — LAPAROVUE VISIBILITY SYSTEM LAPAROSCOPIC SOLUTIONS: Brand: LAPAROVUE

## (undated) DEVICE — 40580 - THE PINK PAD - ADVANCED TRENDELENBURG POSITIONING KIT: Brand: 40580 - THE PINK PAD - ADVANCED TRENDELENBURG POSITIONING KIT

## (undated) DEVICE — ADHESIVE SKIN TOP FOR WND CLSR DERMBND ADV

## (undated) DEVICE — NEPTUNE E-SEP SMOKE EVACUATION PENCIL, COATED, 70MM BLADE, PUSH BUTTON SWITCH: Brand: NEPTUNE E-SEP

## (undated) DEVICE — TROCAR: Brand: KII SHIELDED BLADED ACCESS SYSTEM

## (undated) DEVICE — UNIVERSAL STAPLER: Brand: ENDO GIA ULTRA

## (undated) DEVICE — TROCAR: Brand: KII® SLEEVE

## (undated) DEVICE — COVER,LIGHT,CAMERA,HARD,1/PK,STRL: Brand: MEDLINE

## (undated) DEVICE — ENDOPATH ULTRA VERESS INSUFFLATION NEEDLES WITH LUER LOCK CONNECTORS: Brand: ENDOPATH

## (undated) DEVICE — SLEEVE COMPR MD KNEE LEN SGL USE KENDALL SCD

## (undated) DEVICE — SUT ETHBND XL 0 30IN CT-1 NABSRB GRN 36MM 1/2

## (undated) DEVICE — ARTICULATION RELOAD WITH TRI-STAPLE TECHNOLOGY: Brand: ENDO GIA

## (undated) DEVICE — SUT COAT VCRL+ 0 27IN UR-6 ABSRB VLT ANTIBACT

## (undated) DEVICE — MARYLAND JAW LAPAROSCOPIC SEALER/DIVIDER COATED: Brand: LIGASURE

## (undated) DEVICE — GRABBER GRASPER TIP, DISPOSABLE: Brand: RENEW

## (undated) DEVICE — SUT MCRYL 4-0 18IN PS-2 ABSRB UD 19MM 3/8 CIR

## (undated) NOTE — MR AVS SNAPSHOT
[facility] [address] [address] [phone]               Thank you for choosing us for your health care visit with NIELS Weinberg.   We are glad to serve you and happy to provide you with this summar Commonly known as:  PROAIR HFA           AmLODIPine Besylate 5 MG Tabs   Take 1 tablet (5 mg total) by mouth once daily.    Commonly known as:  Naye Mccall                Where to Get Your Medications      These medications were sent to [address] active are less likely to develop some chronic diseases than adults who are inactive.      HOW TO GET STARTED: HOW TO STAY MOTIVATED:   Start activities slowly and build up over time Do what you like   Get your heart pumping – brisk walking, biking, swimmin

## (undated) NOTE — LETTER
[facility], Maco Boone, [place]   Date:   4/28/2023     Name:   Amira Donovan    YOB: 1975   MRN:   [id number]       WHERE IS YOUR PAIN NOW? Niranjan the areas on your body where you feel the described sensations. Use the appropriate symbol. Jeanette [place] the areas of radiation. Include all affected areas. Just to complete the picture, please draw in the face. ACHE:  ^ ^ ^   NUMBNESS:  0000   PINS & NEEDLES:  = = = =                              ^ ^ ^                       0000              = = = =                                    ^ ^ ^                       0000            = = = =      BURNING:  XXXX   STABBING: ////                  XXXX                ////                         XXXX          ////     Please niranjan the line below indicating your degree of pain right now  with 0 being no pain 10 being the worst pain possible.                                          0             1             2              3             4              5              6              7             8             9             10         Patient Signature:

## (undated) NOTE — LETTER
25    Patient: Jeffrey M Fritsch  : 10/12/1975 Visit date: 2025    Dear  Filipe Suárez MD    Thank you for referring Jeffrey M Fritsch to my practice.  Please find my assessment and plan below.        Good afternoon.  Thank you for the referral.  I saw Cooper in my clinic yesterday for appendiceal sessile serrated polyp.  I discussed with Cooper his diagnosis. We will plan for laparoscopic appendectomy in May.  Cooper also has an umbilical hernia that will be repaired at the same time.  Finally, he has chronic right groin and hip pain.  I will obtain an ultrasound to rule out hernia.  Thank you once again for the referral and please let me know if you have any questions or concerns.    Sincerely,       Sena Wilkins MD   CC: Tim Paul MD

## (undated) NOTE — Clinical Note
ASTHMA ACTION PLAN for Milton Wilkins     : 10/12/1975     Date: 2017  Provider:  NIELS Goode  Phone for doctor or clinic: [facility], [address] [address] [facility], [address] [place] [address] [phone]

## (undated) NOTE — LETTER
AUTHORIZATION FOR SURGICAL OPERATION OR OTHER PROCEDURE    1. I hereby authorize Dr. Noe Blanco and the [facility] staff assigned to my case to perform the following operation and/or procedure at the [facility]:  Right piriformis injection with ultrasound guidance, local anesthesia     2.  My physician has explained the nature and purpose of the operation or other procedure, possible alternative methods of treatment, the risks involved, and the possibility of complication to me.  I acknowledge that no guarantee has been made as to the result that may be obtained.  3.  I recognize that, during the course of this operation, or other procedure, unforseen conditions may necessitate additional or different procedure than those listed above.  I, therefore, further authorize and request that the above named physician, his/her physician assistants or designees perform such procedures as are, in his/her professional opinion, necessary and desirable.  4.  Any tissue or organs removed in the operation or other procedure may be disposed of by and at the discretion of the [facility] staff and [facility].  5.  I understand that in the event of a medical emergency, I will be transported by local paramedics to [facility] or other hospital emergency department.  6.  I certify that I have read and fully understand the above consent to operation and/or other procedure.    7.  I acknowledge that my physician has explained sedation/analgesia administration to me including the risks and benefits.  I consent to the administration of sedation/analgesia as may be necessary or desirable in the judgement of my physician.    Witness signature: ___________________________________________________ Date:  ______/______/_____                    Time:  ________ A.M.  P.M. Jeffrey M Fritsch  10/12/1975  [id number]         Patient signature:  ___________________________________________________      Statement  of Physician  My signature below affirms that prior to the time of the procedure, I have explained to the patient and/or his/her guardian, the risks and benefits involved in the proposed treatment and any reasonable alternative to the proposed treatment.  I have also explained the risks and benefits involved in the refusal of the proposed treatment and have answered the patient's questions.                        Date:  ______/______/_______  Provider                      Signature:  __________________________________________________________       Time:  ___________ A.M    P.M.

## (undated) NOTE — LETTER
Date: 2024      Patient Name: Jeffrey M Fritsch      : 10/12/1975        Thank you for choosing [facility] as your health care provider. Your physician has deemed the following medical service(s) necessary. However, your insurance plan may not pay for all of your health care and costs and may deny payment for this service. The fact that your insurance plan does not pay for an item or service does not mean you should not receive it. The purpose of this form is to help you make an informed decision about whether or not you want to receive this service(s) that may not be paid for by your insurance plan.    CPT Code Description     Cost     Right iliopsoas bursa injection, ultrasound guidance     _________ ______________________________ _____________      _________ ______________________________ _____________      I understand that the above mentioned service(s) or supply may not be covered by my insurance company. I agree to be financially responsible for the cost of this service or supply in the event of my insurance denies payment as a non-covered benefit.        ______________________________________________________________________  Signature of Patient or Patient's Representative  Relationship  Date    ______________________________________________________________________  Signature of Witness to signing of form   Printed Name

## (undated) NOTE — LETTER
ASTHMA ACTION PLAN for Jeffrey M Fritsch     : 10/12/1975     Date: 10/03/24  Doctor:  Tim Paul MD  Phone for doctor or clinic: [facility], [address] [place] [address] [phone]      ACT Score: 25    ACT Goal: 20 or greater    Call your provider if you require your rescue/quick reliever medication more than 2-3 times in a 24 hour period.    If you require your rescue inhaler/medication more than 2-3 times weekly, your asthma may not be under proper control and you should seek medical attention.    *Quick Relievers are Xopenex and Albuterol*    You can use the colors of a traffic light to help learn about your asthma medicines.  Therapy Range       1. Green - Go! % of Personal Best Peak Flow   Use controller medicine.   Breathing is good  No cough or wheeze  Can work and play Medicine How much to take When to take it    Medications       Sympathomimetics Instructions     Albuterol Sulfate HFA (PROAIR HFA) 108 (90 BASE) MCG/ACT Inhalation Aero Soln Inhale 2 puffs into the lungs every 6 (six) hours as needed for Wheezing.     Patient not taking: Reported on 10/3/2024                    2. Yellow - Caution. 50-79% Personal Best Peak Flow  Use reliever medicine to keep an asthma attack from getting bad.   Cough  Quick Relievers  Wheezing  Tight Chest  Wake up at night Medicine How much to take When to take it    If symptoms are not improving in 24-48 hrs, call office for further instructions  Medications       Sympathomimetics Instructions     Albuterol Sulfate HFA (PROAIR HFA) 108 (90 BASE) MCG/ACT Inhalation Aero Soln Inhale 2 puffs into the lungs every 6 (six) hours as needed for Wheezing.     Patient not taking: Reported on 10/3/2024                    3. Red - Stop! Danger! <50% Personal Best Peak Flow  Continue Controller Medications But ADD:   Medicine not helping  Breathing is hard and fast  Nose opens wide  Can't walk  Ribs  show  Can't talk well Medicine How much to take When to take it    If your symptoms do not improve in ONE hour -  go to the emergency room or call 911 immediately! If symptoms improve, call office for appointment immediately.    Albuterol inhaler 2 puffs every 20 minutes for three treatments       Don't forget:  Rinse mouth after using inhaler  Use spacer for inhaler  Remember to get your Flu vaccine every fall!    [x] Asthma Action Plan reviewed with the caregiver and patient, and a copy of the plan was given to the patient/caregiver.   [] Asthma Action Plan reviewed with the caregiver and patient on the phone, and copy mailed to patient/caregiver or sent via [employer].     Signatures:   Provider  Tim Paul MD Patient  Jeffrey M Fritsch Caretaker       ASTHMA ACTION PLAN for Jeffrey M Fritsch     : 10/12/1975     Date: 10/3/2024  Provider:  Tim Paul MD  Phone for doctor or clinic: [address] [address] [phone]    ACT Score: 25      You can use the colors of a traffic light to help learn about your asthma medicines.      1. Green - Go! % of Personal Best Peak Flow Use controller medicine.   Breathing is good  No cough or wheeze  Can work and play Medicine How much to take When to take it          2. Yellow - Caution. 50-79% Personal Best Peak  Flow.  Use reliever medicine to keep an asthma attack from getting bad.   Cough  Wheezing  Tight Chest  Wake up at night Medicine How much to take When to take it           Additional instructions         3. Red - Stop! Danger!  <50% Personal Best Peak  Flow. Take these medications until  Get help from a doctor   Medicine not helping  Breathing is hard and fast  Nose opens wide  Can't walk  Ribs show  Can't talk well Medicine How much to take When to take it         Additional Instructions If your symptoms do not improve and you cannot contact your doctor, go to [facility]  room or call 911 immediately!     [x] Asthma Action Plan reviewed with patient (and caregiver if necessary) and a copy of the plan was given to the patient/caregiver.   [] Asthma Action Plan reviewed with patient (and caregiver if necessary) on the phone and mailed copy to patient or submitted via [employer].     Signatures:  Provider  Tim Paul MD   Patient Caretaker

## (undated) NOTE — LETTER
ASTHMA ACTION PLAN for Linette Witt     : 10/12/1975     Date: 3/15/2019  Provider:  Zachariah Stubbs MD  Phone for doctor or clinic: [facility], [address] [facility], [address] [place] [address] [phone]

## (undated) NOTE — LETTER
ASTHMA ACTION PLAN for Jazmyn Casey     : 10/12/1975     Date: 2020  Provider:  Rachel Ricci MD  Phone for doctor or clinic: [facility], [address] [place] [address], [facility]. [address], [address] [facility]  [phone]

## (undated) NOTE — LETTER
03/07/18        [phone] [id number]      Dear Toney Morris,    Our records indicate that you have outstanding lab work and or testing that was ordered for you and has not yet been completed:    CBC With Diff  CMP  Lipid  TS

## (undated) NOTE — LETTER
Date: 2024      Patient Name: Jeffrey M Fritsch      : 10/12/1975        Thank you for choosing [facility] as your health care provider. Your physician has deemed the following medical service(s) necessary. However, your insurance plan may not pay for all of your health care and costs and may deny payment for this service. The fact that your insurance plan does not pay for an item or service does not mean you should not receive it. The purpose of this form is to help you make an informed decision about whether or not you want to receive this service(s) that may not be paid for by your insurance plan.    CPT Code Description     Cost     Right piriformis injection with ultrasound guidance, local anesthesia       I understand that the above mentioned service(s) or supply may not be covered by my insurance company. I agree to be financially responsible for the cost of this service or supply in the event of my insurance denies payment as a non-covered benefit.        ______________________________________________________________________  Signature of Patient or Patient's Representative  Relationship  Date    ______________________________________________________________________  Signature of Witness to signing of form   Printed Name

## (undated) NOTE — LETTER
ASTHMA ACTION PLAN for Kailahs Cherry     : 10/12/1975     Date: 2023  Provider:  Clayton Davidson MD  Phone for doctor or clinic: [facility], [address] [facility], [place] [address] [phone]    ACT Score: 25      You can use the colors of a traffic light to help learn about your asthma medicines. 1. Green - Go! % of Personal Best Peak Flow Use controller medicine. Breathing is good  No cough or wheeze  Can work and play Medicine How much to take When to take it    Avoid triggers       2. Yellow - Caution. 50-79% Personal Best Peak  Flow. Use reliever medicine to keep an asthma attack from getting bad. Cough  Wheezing  Tight Chest  Wake up at night Medicine How much to take When to take it      Albuterol Sulfate HFA (PROAIR HFA) 108 (90 BASE) MCG/ACT Inhalation Aero Soln  Inhale 2 puffs into the lungs every 6 (six) hours as needed for Wheezing. Additional instructions         3. Red - Stop! Danger!  <50% Personal Best Peak  Flow. Take these medications until  Get help from a doctor   Medicine not helping  Breathing is hard and fast  Nose opens wide  Can't walk  Ribs show  Can't talk well Medicine How much to take When to take it    Go to the nearest Emergency Room/Department right now! Additional Instructions If your symptoms do not improve and you cannot contact your doctor, [facility] or call [facility] immediately! [x] Asthma Action Plan reviewed with patient (and caregiver if necessary) and a copy of the plan was given to the patient/caregiver. [] Asthma Action Plan reviewed with patient (and caregiver if necessary) on the phone and mailed copy to patient or submitted via [address]      Signatures:  Provider  Clayton Davidson MD   Patient Caretaker

## (undated) NOTE — LETTER
AUTHORIZATION FOR SURGICAL OPERATION OR OTHER PROCEDURE    1. I hereby authorize Dr. Noe Blanco and the [facility] staff assigned to my case to perform the following operation and/or procedure at the [facility]:    Right iliopsoas bursa injection, ultrasound guidance     2.  My physician has explained the nature and purpose of the operation or other procedure, possible alternative methods of treatment, the risks involved, and the possibility of complication to me.  I acknowledge that no guarantee has been made as to the result that may be obtained.  3.  I recognize that, during the course of this operation, or other procedure, unforseen conditions may necessitate additional or different procedure than those listed above.  I, therefore, further authorize and request that the above named physician, his/her physician assistants or designees perform such procedures as are, in his/her professional opinion, necessary and desirable.  4.  Any tissue or organs removed in the operation or other procedure may be disposed of by and at the discretion of the [facility] staff and [facility].  5.  I understand that in the event of a medical emergency, I will be transported by local paramedics to [facility] or other hospital emergency department.  6.  I certify that I have read and fully understand the above consent to operation and/or other procedure.    7.  I acknowledge that my physician has explained sedation/analgesia administration to me including the risks and benefits.  I consent to the administration of sedation/analgesia as may be necessary or desirable in the judgement of my physician.    Witness signature: ___________________________________________________ Date:  ______/______/_____                    Time:  ________ A.M.  P.M.       Patient Name:  Jeffrey M Fritsch  10/12/1975  [id number]         Patient signature:   ___________________________________________________               Statement of Physician  My signature below affirms that prior to the time of the procedure, I have explained to the patient and/or his/her guardian, the risks and benefits involved in the proposed treatment and any reasonable alternative to the proposed treatment.  I have also explained the risks and benefits involved in the refusal of the proposed treatment and have answered the patient's questions.                        Date:  ______/______/_______  Provider                      Signature:  __________________________________________________________       Time:  ___________ AECHO MATTHEWS